# Patient Record
Sex: MALE | Race: WHITE | NOT HISPANIC OR LATINO
[De-identification: names, ages, dates, MRNs, and addresses within clinical notes are randomized per-mention and may not be internally consistent; named-entity substitution may affect disease eponyms.]

---

## 2019-10-10 PROBLEM — Z00.00 ENCOUNTER FOR PREVENTIVE HEALTH EXAMINATION: Status: ACTIVE | Noted: 2019-10-10

## 2019-10-18 ENCOUNTER — APPOINTMENT (OUTPATIENT)
Dept: FAMILY MEDICINE | Facility: CLINIC | Age: 24
End: 2019-10-18
Payer: MEDICAID

## 2019-10-18 VITALS
RESPIRATION RATE: 14 BRPM | SYSTOLIC BLOOD PRESSURE: 100 MMHG | DIASTOLIC BLOOD PRESSURE: 70 MMHG | WEIGHT: 145 LBS | OXYGEN SATURATION: 98 % | HEART RATE: 76 BPM | HEIGHT: 69 IN | BODY MASS INDEX: 21.48 KG/M2

## 2019-10-18 DIAGNOSIS — Z80.3 FAMILY HISTORY OF MALIGNANT NEOPLASM OF BREAST: ICD-10-CM

## 2019-10-18 DIAGNOSIS — G47.30 SLEEP APNEA, UNSPECIFIED: ICD-10-CM

## 2019-10-18 DIAGNOSIS — Z82.0 FAMILY HISTORY OF EPILEPSY AND OTHER DISEASES OF THE NERVOUS SYSTEM: ICD-10-CM

## 2019-10-18 DIAGNOSIS — Z78.9 OTHER SPECIFIED HEALTH STATUS: ICD-10-CM

## 2019-10-18 DIAGNOSIS — F42.9 OBSESSIVE-COMPULSIVE DISORDER, UNSPECIFIED: ICD-10-CM

## 2019-10-18 DIAGNOSIS — F90.9 ATTENTION-DEFICIT HYPERACTIVITY DISORDER, UNSPECIFIED TYPE: ICD-10-CM

## 2019-10-18 DIAGNOSIS — F60.5 OBSESSIVE-COMPULSIVE PERSONALITY DISORDER: ICD-10-CM

## 2019-10-18 DIAGNOSIS — Z81.1 FAMILY HISTORY OF ALCOHOL ABUSE AND DEPENDENCE: ICD-10-CM

## 2019-10-18 DIAGNOSIS — J30.9 ALLERGIC RHINITIS, UNSPECIFIED: ICD-10-CM

## 2019-10-18 PROCEDURE — 90674 CCIIV4 VAC NO PRSV 0.5 ML IM: CPT

## 2019-10-18 PROCEDURE — 99204 OFFICE O/P NEW MOD 45 MIN: CPT | Mod: 25

## 2019-10-18 PROCEDURE — G0008: CPT

## 2019-10-18 PROCEDURE — G0444 DEPRESSION SCREEN ANNUAL: CPT

## 2019-10-18 RX ORDER — LEVOMEFOLATE CALCIUM 15 MG
15 TABLET ORAL DAILY
Refills: 0 | Status: ACTIVE | COMMUNITY

## 2019-10-18 NOTE — REVIEW OF SYSTEMS
[Negative] : Heme/Lymph [Earache] : earache [Cough] : cough [Depression] : depression [Anxiety] : anxiety

## 2019-10-18 NOTE — ASSESSMENT
[FreeTextEntry1] : RTO for full physical and fasting labs \par Vitals  stable \par Flu vaccine admin to L deltoid, tolerated well \par Start abx, take with food, start flonase, if ear pain persists pt instructed to follow up with ENT. Cough likely related to allergic congestion, may start antihistamine in addition to flonase. \par Pt instructed to follow up with psych asap. Referrals given.

## 2019-10-18 NOTE — PHYSICAL EXAM
[Normal] : normal rate, regular rhythm, normal S1 and S2 and no murmur heard [Normal Sclera/Conjunctiva] : normal sclera/conjunctiva [Normal Outer Ear/Nose] : the outer ears and nose were normal in appearance [Supple] : supple [No Lymphadenopathy] : no lymphadenopathy [de-identified] : bilateral nasal turbinates and posterior oropharynx erythematous, moderate clear post nasal drip, R TM moderate serous effusion, purulence noted behind TM. L TM normal

## 2019-10-18 NOTE — HEALTH RISK ASSESSMENT
[Good] : ~his/her~ current health as good [Yes] : Yes [HIV test declined] : HIV test declined [Hepatitis C test declined] : Hepatitis C test declined [With Family] : lives with family [None] : None [Employed] : employed [Sexually Active] : sexually active [Single] : single [Feels Safe at Home] : Feels safe at home [Fully functional (bathing, dressing, toileting, transferring, walking, feeding)] : Fully functional (bathing, dressing, toileting, transferring, walking, feeding) [Fully functional (using the telephone, shopping, preparing meals, housekeeping, doing laundry, using] : Fully functional and needs no help or supervision to perform IADLs (using the telephone, shopping, preparing meals, housekeeping, doing laundry, using transportation, managing medications and managing finances) [Smoke Detector] : smoke detector [Safety elements used in home] : safety elements used in home [Carbon Monoxide Detector] : carbon monoxide detector [Seat Belt] :  uses seat belt [Sunscreen] : uses sunscreen [1 or 2 (0 pts)] : 1 or 2 (0 points) [2 - 4 times a month (2 pts)] : 2-4 times a month (2 points) [Never (0 pts)] : Never (0 points) [No] : In the past 12 months have you used drugs other than those required for medical reasons? No [No falls in past year] : Patient reported no falls in the past year [High School] : high school [3] : 2) Feeling down, depressed, or hopeless for nearly every day (3) [] : No [de-identified] : none [de-identified] : rarely  [Audit-CScore] : 2 [de-identified] : sedentary  [de-identified] : healthy, well balanced   [[id number]] : 6 [[id number]] : 24 [Change in mental status noted] : No change in mental status noted [Language] : denies difficulty with language [Behavior] : denies difficulty with behavior [Learning/Retaining New Information] : denies difficulty learning/retaining new information [Handling Complex Tasks] : denies difficulty handling complex tasks [Reasoning] : denies difficulty with reasoning [Spatial Ability and Orientation] : denies difficulty with spatial ability and orientation [Reports changes in hearing] : Reports no changes in hearing [Reports changes in vision] : Reports no changes in vision [Reports changes in dental health] : Reports no changes in dental health [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

## 2019-10-18 NOTE — HISTORY OF PRESENT ILLNESS
[FreeTextEntry1] : patient presents to establish care  [de-identified] : Patient presents today to establish care. Reports feeling well today. Patient has a chief complaint of ear pain x several months. Reports associated decreased hearing, vertigo. Pt has ongoing congestion, he attributes to food sensitivities, he has seen allergist with no resolution or definitive allergies. Pt also reports intermittent dry cough, approx 1-2 per day, not bringing up sputum. Denies wheezing, shortness of breath, sinus pressure, headache, night sweats, weight loss. He is currently looking for new psychiatrist to manage his mental health issues, states he has been suffering from mental health issues since childhood, symptoms have been steady. No current suicidal thoughts/plans.

## 2019-11-21 ENCOUNTER — APPOINTMENT (OUTPATIENT)
Dept: FAMILY MEDICINE | Facility: CLINIC | Age: 24
End: 2019-11-21
Payer: MEDICAID

## 2019-11-21 VITALS
BODY MASS INDEX: 21.62 KG/M2 | OXYGEN SATURATION: 97 % | HEIGHT: 69 IN | DIASTOLIC BLOOD PRESSURE: 70 MMHG | RESPIRATION RATE: 14 BRPM | SYSTOLIC BLOOD PRESSURE: 92 MMHG | WEIGHT: 146 LBS | HEART RATE: 82 BPM

## 2019-11-21 DIAGNOSIS — Z00.00 ENCOUNTER FOR GENERAL ADULT MEDICAL EXAMINATION W/OUT ABNORMAL FINDINGS: ICD-10-CM

## 2019-11-21 DIAGNOSIS — F41.9 ANXIETY DISORDER, UNSPECIFIED: ICD-10-CM

## 2019-11-21 DIAGNOSIS — F32.9 ANXIETY DISORDER, UNSPECIFIED: ICD-10-CM

## 2019-11-21 LAB
BILIRUB UR QL STRIP: NEGATIVE
GLUCOSE UR-MCNC: NEGATIVE
HCG UR QL: 0.2 EU/DL
HGB UR QL STRIP.AUTO: NEGATIVE
KETONES UR-MCNC: 15
LEUKOCYTE ESTERASE UR QL STRIP: NEGATIVE
NITRITE UR QL STRIP: NEGATIVE
PH UR STRIP: 6.5
PROT UR STRIP-MCNC: NEGATIVE
SP GR UR STRIP: 1.02

## 2019-11-21 PROCEDURE — 36415 COLL VENOUS BLD VENIPUNCTURE: CPT

## 2019-11-21 PROCEDURE — 99395 PREV VISIT EST AGE 18-39: CPT | Mod: 25

## 2019-11-21 PROCEDURE — G0444 DEPRESSION SCREEN ANNUAL: CPT

## 2019-11-21 PROCEDURE — 81003 URINALYSIS AUTO W/O SCOPE: CPT | Mod: QW

## 2019-11-21 NOTE — HISTORY OF PRESENT ILLNESS
[FreeTextEntry1] : cpe [de-identified] : Patient reports today for a physical. Patient reports feeling well today, no acute complaints. Denies chest pain, shortness of breath, palpitations, headaches, edema, dizziness, bowel or bladder changes. Pt states his anxiety, depression has improved since the last visit -  he is still considering finding a therapist/ psychiatrist. He does not want to go on any medication. He denies any suicidal thoughts or plans.

## 2019-11-21 NOTE — ASSESSMENT
[FreeTextEntry1] : Check labs drawn in office today for above assessed conditions. Labs to be resulted at the [facility] core lab.\par PHQ improved from 24 to 17. Pt encouraged to seek psychiatry and therapist. Pt states he will consider it. \par Vitals and exam stable \par UA normal

## 2019-11-21 NOTE — PHYSICAL EXAM
[No Acute Distress] : no acute distress [Well Nourished] : well nourished [Well Developed] : well developed [Well-Appearing] : well-appearing [Normal Sclera/Conjunctiva] : normal sclera/conjunctiva [PERRL] : pupils equal round and reactive to light [EOMI] : extraocular movements intact [Normal Outer Ear/Nose] : the outer ears and nose were normal in appearance [Normal Oropharynx] : the oropharynx was normal [Normal Nasal Mucosa] : the nasal mucosa was normal [No JVD] : no jugular venous distention [No Lymphadenopathy] : no lymphadenopathy [Supple] : supple [Thyroid Normal, No Nodules] : the thyroid was normal and there were no nodules present [No Respiratory Distress] : no respiratory distress  [No Accessory Muscle Use] : no accessory muscle use [Clear to Auscultation] : lungs were clear to auscultation bilaterally [Normal Rate] : normal rate  [Regular Rhythm] : with a regular rhythm [Normal S1, S2] : normal S1 and S2 [No Murmur] : no murmur heard [No Carotid Bruits] : no carotid bruits [No Abdominal Bruit] : a ~M bruit was not heard ~T in the abdomen [No Varicosities] : no varicosities [Pedal Pulses Present] : the pedal pulses are present [No Edema] : there was no peripheral edema [No Palpable Aorta] : no palpable aorta [No Extremity Clubbing/Cyanosis] : no extremity clubbing/cyanosis [Soft] : abdomen soft [Non Tender] : non-tender [Non-distended] : non-distended [No Masses] : no abdominal mass palpated [No HSM] : no HSM [Normal Bowel Sounds] : normal bowel sounds [Normal Posterior Cervical Nodes] : no posterior cervical lymphadenopathy [Normal Anterior Cervical Nodes] : no anterior cervical lymphadenopathy [No CVA Tenderness] : no CVA  tenderness [No Spinal Tenderness] : no spinal tenderness [No Joint Swelling] : no joint swelling [Grossly Normal Strength/Tone] : grossly normal strength/tone [No Rash] : no rash [Coordination Grossly Intact] : coordination grossly intact [No Focal Deficits] : no focal deficits [Normal Gait] : normal gait [Speech Grossly Normal] : speech grossly normal [Memory Grossly Normal] : memory grossly normal [Normal Affect] : the affect was normal [Alert and Oriented x3] : oriented to person, place, and time [Normal Mood] : the mood was normal [Normal Insight/Judgement] : insight and judgment were intact [de-identified] : bilateral Tms mild erythema, no purulence noted behind TM, mild serous effusion

## 2019-11-25 LAB
ALBUMIN SERPL ELPH-MCNC: 4.5 G/DL
ALP BLD-CCNC: 72 U/L
ALT SERPL-CCNC: 12 U/L
ANION GAP SERPL CALC-SCNC: 12 MMOL/L
AST SERPL-CCNC: 15 U/L
BASOPHILS # BLD AUTO: 0.04 K/UL
BASOPHILS NFR BLD AUTO: 1 %
BILIRUB SERPL-MCNC: 0.6 MG/DL
BUN SERPL-MCNC: 15 MG/DL
CALCIUM SERPL-MCNC: 9.9 MG/DL
CHLORIDE SERPL-SCNC: 101 MMOL/L
CHOLEST SERPL-MCNC: 214 MG/DL
CHOLEST/HDLC SERPL: 4 RATIO
CO2 SERPL-SCNC: 27 MMOL/L
CREAT SERPL-MCNC: 0.85 MG/DL
EOSINOPHIL # BLD AUTO: 0.05 K/UL
EOSINOPHIL NFR BLD AUTO: 1.3 %
GLUCOSE SERPL-MCNC: 89 MG/DL
HCT VFR BLD CALC: 49 %
HDLC SERPL-MCNC: 53 MG/DL
HGB BLD-MCNC: 15.4 G/DL
IMM GRANULOCYTES NFR BLD AUTO: 0.3 %
LDLC SERPL CALC-MCNC: 150 MG/DL
LYMPHOCYTES # BLD AUTO: 1.39 K/UL
LYMPHOCYTES NFR BLD AUTO: 35.9 %
MAN DIFF?: NORMAL
MCHC RBC-ENTMCNC: 26.8 PG
MCHC RBC-ENTMCNC: 31.4 GM/DL
MCV RBC AUTO: 85.2 FL
MONOCYTES # BLD AUTO: 0.55 K/UL
MONOCYTES NFR BLD AUTO: 14.2 %
NEUTROPHILS # BLD AUTO: 1.83 K/UL
NEUTROPHILS NFR BLD AUTO: 47.3 %
PLATELET # BLD AUTO: 191 K/UL
POTASSIUM SERPL-SCNC: 5 MMOL/L
PROT SERPL-MCNC: 7.1 G/DL
RBC # BLD: 5.75 M/UL
RBC # FLD: 12.6 %
SODIUM SERPL-SCNC: 140 MMOL/L
T4 SERPL-MCNC: 6.1 UG/DL
TRIGL SERPL-MCNC: 53 MG/DL
TSH SERPL-ACNC: 0.97 UIU/ML
WBC # FLD AUTO: 3.87 K/UL

## 2019-12-17 DIAGNOSIS — E78.5 HYPERLIPIDEMIA, UNSPECIFIED: ICD-10-CM

## 2020-01-30 ENCOUNTER — APPOINTMENT (OUTPATIENT)
Dept: FAMILY MEDICINE | Facility: CLINIC | Age: 25
End: 2020-01-30
Payer: MEDICAID

## 2020-01-30 VITALS
HEART RATE: 74 BPM | SYSTOLIC BLOOD PRESSURE: 94 MMHG | HEIGHT: 69 IN | RESPIRATION RATE: 14 BRPM | BODY MASS INDEX: 21.62 KG/M2 | DIASTOLIC BLOOD PRESSURE: 62 MMHG | OXYGEN SATURATION: 98 % | WEIGHT: 146 LBS

## 2020-01-30 DIAGNOSIS — Z92.29 PERSONAL HISTORY OF OTHER DRUG THERAPY: ICD-10-CM

## 2020-01-30 DIAGNOSIS — R05 COUGH: ICD-10-CM

## 2020-01-30 DIAGNOSIS — Z13.220 ENCOUNTER FOR SCREENING FOR LIPOID DISORDERS: ICD-10-CM

## 2020-01-30 DIAGNOSIS — Z13.0 ENCOUNTER FOR SCREENING FOR DISEASES OF THE BLOOD AND BLOOD-FORMING ORGANS AND CERTAIN DISORDERS INVOLVING THE IMMUNE MECHANISM: ICD-10-CM

## 2020-01-30 DIAGNOSIS — Z13.29 ENCOUNTER FOR SCREENING FOR OTHER SUSPECTED ENDOCRINE DISORDER: ICD-10-CM

## 2020-01-30 DIAGNOSIS — Z76.89 PERSONS ENCOUNTERING HEALTH SERVICES IN OTHER SPECIFIED CIRCUMSTANCES: ICD-10-CM

## 2020-01-30 DIAGNOSIS — H66.001 ACUTE SUPPURATIVE OTITIS MEDIA W/OUT SPONTANEOUS RUPTURE OF EAR DRUM, RIGHT EAR: ICD-10-CM

## 2020-01-30 DIAGNOSIS — R09.82 POSTNASAL DRIP: ICD-10-CM

## 2020-01-30 PROCEDURE — 99214 OFFICE O/P EST MOD 30 MIN: CPT

## 2020-01-30 RX ORDER — LEVOCETIRIZINE DIHYDROCHLORIDE 5 MG/1
5 TABLET ORAL DAILY
Qty: 30 | Refills: 0 | Status: ACTIVE | COMMUNITY
Start: 2020-01-30 | End: 1900-01-01

## 2020-01-30 RX ORDER — AMOXICILLIN AND CLAVULANATE POTASSIUM 875; 125 MG/1; MG/1
875-125 TABLET, COATED ORAL
Qty: 20 | Refills: 0 | Status: ACTIVE | COMMUNITY
Start: 2019-10-18 | End: 1900-01-01

## 2020-01-30 RX ORDER — FLUTICASONE PROPIONATE 50 UG/1
50 SPRAY, METERED NASAL
Qty: 1 | Refills: 0 | Status: ACTIVE | COMMUNITY
Start: 2019-10-18 | End: 1900-01-01

## 2020-01-30 NOTE — ASSESSMENT
[FreeTextEntry1] : Start flonase, xyzal, and abx - take with food\par If sx persist pt to follow up with ENT

## 2020-01-30 NOTE — PHYSICAL EXAM
[Normal Sclera/Conjunctiva] : normal sclera/conjunctiva [Normal Outer Ear/Nose] : the outer ears and nose were normal in appearance [No Lymphadenopathy] : no lymphadenopathy [Normal] : normal rate, regular rhythm, normal S1 and S2 and no murmur heard [de-identified] : bilateral nasal turbinates and posterior oropharynx erythematous, moderate clear post nasal drip, bilateral TMs mild erythema, purulence noted bilaterally to inferior portion of TMs, mild serous effusion.

## 2020-01-30 NOTE — HISTORY OF PRESENT ILLNESS
[FreeTextEntry8] : pt c/o intermittent bilateral ear pain, dry cough since last visit. Reports some mild vertigo a month ago, resolved. Patient states he never took antibiotics due to a pharmacy issue with his insurance. Denies fevers, chills, sore throat.

## 2020-07-17 ENCOUNTER — TRANSCRIPTION ENCOUNTER (OUTPATIENT)
Age: 25
End: 2020-07-17

## 2021-01-17 ENCOUNTER — TRANSCRIPTION ENCOUNTER (OUTPATIENT)
Age: 26
End: 2021-01-17

## 2023-08-14 ENCOUNTER — NON-APPOINTMENT (OUTPATIENT)
Age: 28
End: 2023-08-14

## 2024-12-02 ENCOUNTER — INPATIENT (INPATIENT)
Facility: HOSPITAL | Age: 29
LOS: 37 days | Discharge: HOME | End: 2025-01-09
Attending: STUDENT IN AN ORGANIZED HEALTH CARE EDUCATION/TRAINING PROGRAM | Admitting: PSYCHIATRY & NEUROLOGY
Payer: COMMERCIAL

## 2024-12-02 VITALS
DIASTOLIC BLOOD PRESSURE: 81 MMHG | WEIGHT: 138.89 LBS | SYSTOLIC BLOOD PRESSURE: 125 MMHG | HEIGHT: 69 IN | HEART RATE: 86 BPM | RESPIRATION RATE: 16 BRPM | TEMPERATURE: 98 F | OXYGEN SATURATION: 99 %

## 2024-12-02 DIAGNOSIS — F42.9 OBSESSIVE-COMPULSIVE DISORDER, UNSPECIFIED: ICD-10-CM

## 2024-12-02 DIAGNOSIS — F32.9 MAJOR DEPRESSIVE DISORDER, SINGLE EPISODE, UNSPECIFIED: ICD-10-CM

## 2024-12-02 LAB
ALBUMIN SERPL ELPH-MCNC: 4.8 G/DL — SIGNIFICANT CHANGE UP (ref 3.3–5)
ALP SERPL-CCNC: 85 U/L — SIGNIFICANT CHANGE UP (ref 40–120)
ALT FLD-CCNC: 21 U/L — SIGNIFICANT CHANGE UP (ref 4–41)
AMPHET UR-MCNC: NEGATIVE — SIGNIFICANT CHANGE UP
ANION GAP SERPL CALC-SCNC: 20 MMOL/L — HIGH (ref 7–14)
APAP SERPL-MCNC: <10 UG/ML — LOW (ref 15–25)
APPEARANCE UR: CLEAR — SIGNIFICANT CHANGE UP
AST SERPL-CCNC: 19 U/L — SIGNIFICANT CHANGE UP (ref 4–40)
BARBITURATES UR SCN-MCNC: NEGATIVE — SIGNIFICANT CHANGE UP
BASOPHILS # BLD AUTO: 0.05 K/UL — SIGNIFICANT CHANGE UP (ref 0–0.2)
BASOPHILS NFR BLD AUTO: 0.9 % — SIGNIFICANT CHANGE UP (ref 0–2)
BENZODIAZ UR-MCNC: NEGATIVE — SIGNIFICANT CHANGE UP
BILIRUB SERPL-MCNC: 0.6 MG/DL — SIGNIFICANT CHANGE UP (ref 0.2–1.2)
BILIRUB UR-MCNC: NEGATIVE — SIGNIFICANT CHANGE UP
BUN SERPL-MCNC: 7 MG/DL — SIGNIFICANT CHANGE UP (ref 7–23)
CALCIUM SERPL-MCNC: 10.2 MG/DL — SIGNIFICANT CHANGE UP (ref 8.4–10.5)
CHLORIDE SERPL-SCNC: 93 MMOL/L — LOW (ref 98–107)
CO2 SERPL-SCNC: 25 MMOL/L — SIGNIFICANT CHANGE UP (ref 22–31)
COCAINE METAB.OTHER UR-MCNC: NEGATIVE — SIGNIFICANT CHANGE UP
COLOR SPEC: YELLOW — SIGNIFICANT CHANGE UP
CREAT SERPL-MCNC: 0.93 MG/DL — SIGNIFICANT CHANGE UP (ref 0.5–1.3)
CREATININE URINE RESULT, DAU: 82 MG/DL — SIGNIFICANT CHANGE UP
DIFF PNL FLD: NEGATIVE — SIGNIFICANT CHANGE UP
EGFR: 113 ML/MIN/1.73M2 — SIGNIFICANT CHANGE UP
EOSINOPHIL # BLD AUTO: 0.05 K/UL — SIGNIFICANT CHANGE UP (ref 0–0.5)
EOSINOPHIL NFR BLD AUTO: 0.9 % — SIGNIFICANT CHANGE UP (ref 0–6)
ETHANOL SERPL-MCNC: <10 MG/DL — SIGNIFICANT CHANGE UP
FENTANYL UR QL SCN: NEGATIVE — SIGNIFICANT CHANGE UP
GLUCOSE SERPL-MCNC: 84 MG/DL — SIGNIFICANT CHANGE UP (ref 70–99)
GLUCOSE UR QL: NEGATIVE MG/DL — SIGNIFICANT CHANGE UP
HCT VFR BLD CALC: 45 % — SIGNIFICANT CHANGE UP (ref 39–50)
HGB BLD-MCNC: 15 G/DL — SIGNIFICANT CHANGE UP (ref 13–17)
IANC: 3.32 K/UL — SIGNIFICANT CHANGE UP (ref 1.8–7.4)
IMM GRANULOCYTES NFR BLD AUTO: 0.2 % — SIGNIFICANT CHANGE UP (ref 0–0.9)
KETONES UR-MCNC: 80 MG/DL
LEUKOCYTE ESTERASE UR-ACNC: NEGATIVE — SIGNIFICANT CHANGE UP
LYMPHOCYTES # BLD AUTO: 1.74 K/UL — SIGNIFICANT CHANGE UP (ref 1–3.3)
LYMPHOCYTES # BLD AUTO: 29.9 % — SIGNIFICANT CHANGE UP (ref 13–44)
MCHC RBC-ENTMCNC: 26.3 PG — LOW (ref 27–34)
MCHC RBC-ENTMCNC: 33.3 G/DL — SIGNIFICANT CHANGE UP (ref 32–36)
MCV RBC AUTO: 78.9 FL — LOW (ref 80–100)
METHADONE UR-MCNC: NEGATIVE — SIGNIFICANT CHANGE UP
MONOCYTES # BLD AUTO: 0.65 K/UL — SIGNIFICANT CHANGE UP (ref 0–0.9)
MONOCYTES NFR BLD AUTO: 11.2 % — SIGNIFICANT CHANGE UP (ref 2–14)
NEUTROPHILS # BLD AUTO: 3.32 K/UL — SIGNIFICANT CHANGE UP (ref 1.8–7.4)
NEUTROPHILS NFR BLD AUTO: 56.9 % — SIGNIFICANT CHANGE UP (ref 43–77)
NITRITE UR-MCNC: NEGATIVE — SIGNIFICANT CHANGE UP
NRBC # BLD: 0 /100 WBCS — SIGNIFICANT CHANGE UP (ref 0–0)
NRBC # FLD: 0 K/UL — SIGNIFICANT CHANGE UP (ref 0–0)
OPIATES UR-MCNC: NEGATIVE — SIGNIFICANT CHANGE UP
OXYCODONE UR-MCNC: NEGATIVE — SIGNIFICANT CHANGE UP
PCP SPEC-MCNC: SIGNIFICANT CHANGE UP
PCP UR-MCNC: NEGATIVE — SIGNIFICANT CHANGE UP
PH UR: 6.5 — SIGNIFICANT CHANGE UP (ref 5–8)
PLATELET # BLD AUTO: 185 K/UL — SIGNIFICANT CHANGE UP (ref 150–400)
POTASSIUM SERPL-MCNC: 3.9 MMOL/L — SIGNIFICANT CHANGE UP (ref 3.5–5.3)
POTASSIUM SERPL-SCNC: 3.9 MMOL/L — SIGNIFICANT CHANGE UP (ref 3.5–5.3)
PROT SERPL-MCNC: 7.8 G/DL — SIGNIFICANT CHANGE UP (ref 6–8.3)
PROT UR-MCNC: SIGNIFICANT CHANGE UP MG/DL
RBC # BLD: 5.7 M/UL — SIGNIFICANT CHANGE UP (ref 4.2–5.8)
RBC # FLD: 13 % — SIGNIFICANT CHANGE UP (ref 10.3–14.5)
SALICYLATES SERPL-MCNC: <0.3 MG/DL — LOW (ref 15–30)
SARS-COV-2 RNA SPEC QL NAA+PROBE: SIGNIFICANT CHANGE UP
SODIUM SERPL-SCNC: 138 MMOL/L — SIGNIFICANT CHANGE UP (ref 135–145)
SP GR SPEC: 1.01 — SIGNIFICANT CHANGE UP (ref 1–1.03)
THC UR QL: NEGATIVE — SIGNIFICANT CHANGE UP
TOXICOLOGY SCREEN, DRUGS OF ABUSE, SERUM RESULT: SIGNIFICANT CHANGE UP
TSH SERPL-MCNC: 1.74 UIU/ML — SIGNIFICANT CHANGE UP (ref 0.27–4.2)
UROBILINOGEN FLD QL: 1 MG/DL — SIGNIFICANT CHANGE UP (ref 0.2–1)
WBC # BLD: 5.82 K/UL — SIGNIFICANT CHANGE UP (ref 3.8–10.5)
WBC # FLD AUTO: 5.82 K/UL — SIGNIFICANT CHANGE UP (ref 3.8–10.5)

## 2024-12-02 PROCEDURE — 99285 EMERGENCY DEPT VISIT HI MDM: CPT

## 2024-12-02 RX ORDER — LORAZEPAM 1 MG/1
1 TABLET ORAL EVERY 6 HOURS
Refills: 0 | Status: DISCONTINUED | OUTPATIENT
Start: 2024-12-03 | End: 2024-12-10

## 2024-12-02 RX ORDER — TRAZODONE HYDROCHLORIDE 150 MG/1
50 TABLET ORAL AT BEDTIME
Refills: 0 | Status: DISCONTINUED | OUTPATIENT
Start: 2024-12-03 | End: 2025-01-09

## 2024-12-02 RX ORDER — LORAZEPAM 1 MG/1
2 TABLET ORAL ONCE
Refills: 0 | Status: DISCONTINUED | OUTPATIENT
Start: 2024-12-03 | End: 2024-12-10

## 2024-12-02 NOTE — ED ADULT NURSE NOTE - CHIEF COMPLAINT QUOTE
Patient requesting psychiatric admission. Reports he was recently evicted from his apartment and is unable to care for himself. Patient's sister with in reports patient has extreme OCD behaviors and has not been taking his medications, seems to be paranoid about things being "contaminated." Patient calm and cooperative, denies SI/HI. Phx OCD, ADHD, depression. Sister [facility]: [phone]

## 2024-12-02 NOTE — ED PROVIDER NOTE - NSFOLLOWUPINSTRUCTIONS_ED_ALL_ED_FT
Follow up with your PMD within 48-72 hrs. Show copies of your reports given to you.   Worsening, continued or new concerning symptoms return to the emergency department.    You have been given information necessary to follow up with the  [facility] ([facility]) Crisis center & other outpatient  psychiatric clinics within your community    • [facility] walk in Crisis centre  [address] [place] [address]  [phone] [email or web address]  Hours of operation:  Day	                                        Hours  Sunday                                  Closed  Monday                                9am - 3pm  Tuesday                                9am - 3pm  Wednesday                          9am - 3pm  Thursday                               9am - 3pm  Friday                                    9am - 3pm  Saturday                                Closed

## 2024-12-02 NOTE — ED ADULT NURSE REASSESSMENT NOTE - NS ED NURSE REASSESS COMMENT FT1
Report received from day RN Shameka. Pt is A&Ox4 and ambulatory without assistance. Pt appears comfortable and in NAD. Pt has no complaints at this time. Pt cooperative with blood draw. Labs sent. Awaiting psych admission bed. Respirations even and unlabored. VS as noted.

## 2024-12-02 NOTE — ED PROVIDER NOTE - CLINICAL SUMMARY MEDICAL DECISION MAKING FREE TEXT BOX
30 yo M PMH OCD p/w increased paranoia. Patient admits he is unable to take care of himself. Patient believes everything is contaminated. Admits noncompliance with medications and therapy. Admits to not showering x6 months and not washing sheets for same time period. Admits the sheets are filthy with yellow stains, urine, feces and blood stains. Patient use to work for [employer] and [employer] but unable to keep a job at the moment. Mom and dad moved to [place] and patient was taken in by his neighbor but kicked out x1 week ago. Patient admits he only had 5 minutes to pack. Reports he is depressed and living in a room where the pipe burst, mold and mice are around. Denies fever, headache, dizziness, SI/HI/AH/VH, chills, chest pain, shortness of breath, abdominal pain, sick contact or recent travel. Denies alcohol use or other drugs.   Labs, EKG, COVID  SW collateral  Psych consult  Dispo as per consult
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
## 2024-12-02 NOTE — ED PROVIDER NOTE - CARE PLAN
1 Principal Discharge DX:	OCD (obsessive compulsive disorder)  Secondary Diagnosis:	MDD (major depressive disorder)

## 2024-12-02 NOTE — ED BEHAVIORAL HEALTH NOTE - BEHAVIORAL HEALTH NOTE
Writer contacted patient's sister gordon ([phone]) and left a vm  informing her of patient's admission.

## 2024-12-02 NOTE — ED BEHAVIORAL HEALTH ASSESSMENT NOTE - NSSUICPROTFACT_PSY_ALL_CORE
help seeking; no reported hx of SA/Responsibility to children, family, or others/Identifies reasons for living/Supportive social network of family or friends/Fear of death or the actual act of killing self/Positive therapeutic relationships

## 2024-12-02 NOTE — ED ADULT NURSE NOTE - OBJECTIVE STATEMENT
Patient requesting psychiatric admission. Reports he was recently evicted from his apartment and is unable to care for himself. Patient's sister with in reports patient has extreme OCD behaviors and has not been taking his medications, seems to be paranoid about things being "contaminated." Patient calm and cooperative, denies SI/HI. Phx OCD, ADHD, depression  Patient brought to  area for above complaints. Patient is calm and cooperative. Patient's sister also adds that the patient has not been showering and taking care of himself. Patient is calm and cooperative. Evaluated by medical provider. Waiting for further orders and disposition.   RAMAN Krause

## 2024-12-02 NOTE — ED BEHAVIORAL HEALTH ASSESSMENT NOTE - PSYCHIATRIC ISSUES AND PLAN (INCLUDE STANDING AND PRN MEDICATION)
09-Feb-2022 defer standing psychotropic to primary treatment team (Pt reported responding to sertraline in the past). PRNS: ativan 1mg PO/ ativan 2mg IM q6Hrs for agitation. PRN: trazodone 50mg HS for sleep disturbances
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
## 2024-12-02 NOTE — ED BEHAVIORAL HEALTH ASSESSMENT NOTE - DESCRIPTION
single and domiciled alone - on the verge of getting evicted; plans to relocate to [place] once he is discharged from [facility]. college drop out.   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 family based in [place] close with twin sister, Yash. when euthymic, likes writing/ drawing, used to play saxophone. raised Orthodoxy but currently not [employer]ybeterian.  no pets. no reported access to guns self reported hx of sleep apnea (but NOT ON CPAP); unclear bleeding diathesis (not in treatment), hx of rhinosinusitis s/p nasal surgeries upon his  ED presentation, no occurrence of agitation/aggressive behavior.  No verbalization of active/ passive SI/HI.   There are no signs/symptoms of acute homer or florid psychosis.  does not appear intoxicated. not delirious nor catatonic. no PRN meds. overall, no mgt issues reported    Vital Signs Last 24 Hrs  T(C): 36.6 (02 Dec 2024 17:32), Max: 36.6 (02 Dec 2024 17:32)  T(F): 97.8 (02 Dec 2024 17:32), Max: 97.8 (02 Dec 2024 17:32)  HR: 86 (02 Dec 2024 17:32) (86 - 86)  BP: 125/81 (02 Dec 2024 17:32) (125/81 - 125/81)  BP(mean): --  RR: 16 (02 Dec 2024 17:32) (16 - 16)  SpO2: 99% (02 Dec 2024 17:32) (99% - 99%)    Parameters below as of 02 Dec 2024 17:32  Patient On (Oxygen Delivery Method): room air

## 2024-12-02 NOTE — ED BEHAVIORAL HEALTH ASSESSMENT NOTE - OTHER
CVM,  I stop due to worsening OCD symptoms, reported overall functionality has been impaired sister self employed on the verge of eviction

## 2024-12-02 NOTE — ED BEHAVIORAL HEALTH ASSESSMENT NOTE - HPI (INCLUDE ILLNESS QUALITY, SEVERITY, DURATION, TIMING, CONTEXT, MODIFYING FACTORS, ASSOCIATED SIGNS AND SYMPTOMS)
as per Psyckes, with multiple psych diagnoses of Major Depressive Disorder, Unspecified/Other Anxiety Disorder,  Generalized Anxiety Disorder, Restless Legs Syndrome  and Unspecified/Other Depressive Disorder    denied experiencing any specific anxiety disorder symptoms. no signs/ symptoms suggestive of homer (denied grandiosity/ racing thoughts/ increased goal directed activities or engaged in risk taking behavior/ no pressured speech/ no elevated mood/ denied any increased in energy level causing sleep disruption).  no reported perceptual disturbances experienced. he is not paranoid.. denied any thought insertion/ withdrawal/ broadcasting    ** see separate [facility] notes for collateral information obtained from his sister **   (if needs update on Pt's status, can also contact uncle Ariel Alaniz at [phone]) 29 yr old male, single, domiciled alone and self employed. with self reported hx of depression, anxiety, OCD and ADHD; no reported hx of psych admissions; denied any hx of SA but did have past hx of engaging in NSSIB - none recently; he denied any recent illicit substance use.  as per Psyckes, with multiple psych diagnoses of Major Depressive Disorder, Unspecified/Other Anxiety Disorder,  Generalized Anxiety Disorder, Restless Legs Syndrome  and Unspecified/Other Depressive Disorder.  Pt is currently not in psych care.  today, presented to the ED accompanied by sister due to worsening symptoms of anxiety + depression (predominating symptoms more of anxiety rather than depression)    seen bedside.  anxious and dysphoric. claimed that as a child, already had experienced symptoms suggestive of OCD. apart from OCD, also endorses life time hx of depression.  described OCD symptoms experienced - especially recently, as worsening contamination symptoms; other symptoms include "supernatural OCD" - which he described as seeing signs/ symbols - pointing to him "going to hell" (not necessarily believing this since he is not currently Anabaptist). has past hx of engaging in childhood rituals; e.g repeatedly flicking switch for bathroom light - to "scare spiders away"; there was also past hx of compulsive  praying in his younger yrs (was raised a Zoroastrian but currently, he is not Anabaptist).      for the past yr, his predominating OCD symptom is of contamination. specifically, centered on impact of mold spores causing infection/ impact on his health. e.g. cleaned bathroom using vinegar copiously that it led to the bathroom getting destroyed - leading him not to take showers (for almost half a yr) out of fear that if he gets into the shower, he will be "contaminated with the spores".  then relegated to laying in bed for days - not getting up; urinating/ fecal movement on the bed.      regarding his depressive symptoms, described self as "an extremely unhappy person who was always hard on himself". denied attaining any euthymic episode recently. described symptoms as experiencing sad mood daily accompanied by anhedonia; associated symptoms include: poor concentration; sleep disturbances; low energy level. denied any changes to his sleeping pattern. no SI or HI. denied feeling hopeless/ helpless/ worthless    He denied experiencing any signs/ symptoms suggestive of homer (denied grandiosity/ racing thoughts/ increased goal directed activities or engaged in risk taking behavior/ no pressured speech/ no elevated mood/ denied any increased in energy level causing sleep disruption).  no reported perceptual disturbances experienced. he is not paranoid.. denied any thought insertion/ withdrawal/ broadcasting    ** see separate [facility] notes for collateral information obtained from his sister **   (if needs update on Pt's status, can also contact uncle Ariel Alaniz at [phone])

## 2024-12-02 NOTE — ED BEHAVIORAL HEALTH ASSESSMENT NOTE - OTHER PAST PSYCHIATRIC HISTORY (INCLUDE DETAILS REGARDING ONSET, COURSE OF ILLNESS, INPATIENT/OUTPATIENT TREATMENT)
self endorses hx of OCD, depression and ADHD  multiple psychiatrists + therapists in the past but has not seen a psychiatrist x 1.5 - 2 yrs now  denied any hx of in-Pt psych admissions  no reported hx of SA but did have past hx of engaging in NSSIB via scratching self using object or finger nails, hx of biting self  - denied any recent NSSIB

## 2024-12-02 NOTE — ED BEHAVIORAL HEALTH ASSESSMENT NOTE - PAST PSYCHOTROPIC MEDICATION
past trials includes: Olanzapine 5 MG , 1/day; Sertraline Hcl 50 MG , 1/day; Bupropion Hcl Er (Xl)) 300 MG,  1/day; Hydroxyzine Pamoate 25 MG, 2/day; Gabapentin 300 MG, 3/day;  Trazodone Hcl 50 MG , 2/day; Diazepam 5 MG, 2/day

## 2024-12-02 NOTE — ED BEHAVIORAL HEALTH ASSESSMENT NOTE - DIFFERENTIAL
OCD  MDD  ADHD OCD  MDD  ADHD    will need evaluation to determine if there is underlying undiagnosed axis II pathology complicating current depression + anxiety

## 2024-12-02 NOTE — ED BEHAVIORAL HEALTH NOTE - BEHAVIORAL HEALTH NOTE
[facility] Reference #: [id number] - no controlled substances prescribed     PSYCKES:   WITH Behavioral Health Diagnoses including Major Depressive Disorder, Unspecified/Other Anxiety Disorder,  Obsessive-Compulsive Disorder, Generalized Anxiety Disorder,   Restless Legs Syndrome  and Unspecified/Other Depressive Disorder    substance use listed: Cannabis related disorders and Opioid related disorders      past trials includes: Olanzapine 5 MG , 1/day; Sertraline Hcl 50 MG , 1/day; Bupropion Hcl Er (Xl)) 300 MG,  1/day; Hydroxyzine Pamoate 25 MG, 2/day; Gabapentin 300 MG, 3/day;  Trazodone Hcl 50 MG , 2/day; Diazepam 5 MG, 2/day    no psych admissions per PSYCKES    CVM: no attendances in [facility] or OPD    [facility] [address] [facility]/ [employer] site: no pending legal issues listed

## 2024-12-02 NOTE — ED BEHAVIORAL HEALTH ASSESSMENT NOTE - AXIS III
self reported hx of sleep apnea (but NOT ON CPAP); unclear bleeding diathesis (not in treatment), hx of rhinosinusitis s/p nasal surgeries

## 2024-12-02 NOTE — ED BEHAVIORAL HEALTH ASSESSMENT NOTE - SUMMARY
29/M with self reported hx of OCD, depression and ADHD; has no psych admissions; denied any hx of SA but did previously engage in NSSIB; whilst he denied using any illicit substance use including alcohol, as per Psyckes: Pt has hx of cannabis + opiate use + psychostimulant abuse.  Pt is currently not in psych care.  pertinent medical issues include: self reported hx of sleep apnea (NOT ON CPAP); unclear bleeding diathesis (not in treatment), hx of rhinosinusitis and s/p nasal surgeries. today, presented to the ED accompanied by sister due to worsening OCD symptoms     at this time, endorses life long hx of anxiety + depression of which, anxiety symptoms meeting severe OCD criteria whereas depressive symptoms meeting MDD criteria. currently, anxiety symptoms predominating over depression.  reported worsening symptoms in the context of noncompliance to meds.      he is severely affectively dysregulated; is help seeking but with poor judgement.  Pt is not manic; does not appear acutely psychotic.  he is not harboring any passive/ active suicidal or homicidal thoughts.  does not appear intoxicated. is not delirious nor catatonic.      ongoing symptoms causing severe functional impairment.  he is requesting psych admission.   ED service will help facilitate psych admission once Pt is medically cleared.

## 2024-12-02 NOTE — ED BEHAVIORAL HEALTH ASSESSMENT NOTE - RISK ASSESSMENT
RISK FACTORS:  Modifiable risk factors: depression, anxiety/ OCD  Unmodifiable risk factors: young male, reported hx of depression, OCD, ADHD, treatment noncompliance, past hx as per Psyckes of  Cannabis related disorders and Opioid related disorders, father - alcohol, mother - hx of anxiety, Pt has past hx of SIB  Protective factors: denies (and currently no objective evidence of) suicidality, no hx of SA, help seeking, endorses responsibility to his family, no access to lethal means like guns, denied any complex medical issues or chronic pains, is not acutely manic or floridly psychotic, no pending legal cases    Given above, the Pt is currently NOT AT ACUTE risk for self harm nor is he at chronically elevated risk of self-harm.   Pt is seeking psych admission

## 2024-12-02 NOTE — ED ADULT TRIAGE NOTE - CHIEF COMPLAINT QUOTE
Patient requesting psychiatric admission. Reports he was recently evicted from his apartment and is unable to care for himself. Patient's sister with in reports patient has extreme OCD behaviors and has not been taking his medications, seems to be paranoid about things being "contaminated." Patient calm and cooperative, denies SI/HI. Phx OCD, ADHD, depression. Sister [facility]: [phone]

## 2024-12-02 NOTE — ED BEHAVIORAL HEALTH ASSESSMENT NOTE - DETAILS
denied hx of SA per transfer protocol mother - hx of anxiety; past in-Pt psych admissions; father has hx of alcoholism.. no reported hx of SA multiple truncal rashes; no cellulitic nor abscess noted endorses hx of hesitancy but denied any symptoms suggestive of UTI sister informed of this admission. discussed with ONELIA Sears mother - hx of anxiety; past in-Pt psych admissions; father has hx of alcoholism (currently sober).. no reported hx of SA

## 2024-12-02 NOTE — ED BEHAVIORAL HEALTH ASSESSMENT NOTE - NSPRESENTSXS_PSY_ALL_CORE
worsening anxiety >>> depression/Depressed mood/Anhedonia/Global insomnia/Severe anxiety, agitation or panic

## 2024-12-02 NOTE — ED BEHAVIORAL HEALTH ASSESSMENT NOTE - NSBHROSSYSTEMS_PSY_ALL_CORE
currently, Pt denied any headaches, no dizziness, no blurring of vision; no sorethroat; no cough, no fever. no chest pains, no SOB, no palpitations, no abdominal pains, no nausea/ vomiting/ diarrhea; denied any muscle/ joint pains/Genitourinary.../Skin.../Psychiatric

## 2024-12-03 PROCEDURE — 99222 1ST HOSP IP/OBS MODERATE 55: CPT

## 2024-12-03 RX ORDER — GINKGO BILOBA 40 MG
3 CAPSULE ORAL AT BEDTIME
Refills: 0 | Status: DISCONTINUED | OUTPATIENT
Start: 2024-12-03 | End: 2025-01-09

## 2024-12-03 RX ORDER — ACETAMINOPHEN 80 MG/.8ML
650 SOLUTION/ DROPS ORAL EVERY 6 HOURS
Refills: 0 | Status: DISCONTINUED | OUTPATIENT
Start: 2024-12-03 | End: 2025-01-09

## 2024-12-03 NOTE — BH INPATIENT PSYCHIATRY ASSESSMENT NOTE - MSE UNSTRUCTURED FT
Conscious, cooperative, alert.   No psychomotor agitation/retardation.   Good eye contact.   Speech: regular rate and rhythm,   Mood: "I have bad OCD" Affect: appropriate, full range, euthymic.   Thought Process: linear, goal directed   Thought Content: Contamination Obsession.  No Death wish, No Suicidal ideation/intent/plan, No homicidal ideation/intent/plan. No delusions   Perception: No hallucinations   Insight and Judgement: fair  Impulse Control: fair at this time.

## 2024-12-03 NOTE — PSYCHIATRIC REHAB INITIAL EVALUATION - NSBHLOCATIONHOME_PSY_ALL_CORE_FT
Pt reports that he is getting evicted from his landlord due to not taking care of the house. Pt will move to [place] to stay with family after discharge.

## 2024-12-03 NOTE — PSYCHIATRIC REHAB INITIAL EVALUATION - NSBHALCSUBTREAT_PSY_ALL_CORE
Pt has psychiatric history of depression, anxiety, OCD and ADHD without inpatient psychiatric admissions./None

## 2024-12-03 NOTE — BH INPATIENT PSYCHIATRY ASSESSMENT NOTE - NSBHCHARTREVIEWVS_PSY_A_CORE FT
Vital Signs Last 24 Hrs  T(C): 36.6 (12-03-24 @ 00:54), Max: 36.6 (12-03-24 @ 00:54)  T(F): 97.8 (12-03-24 @ 00:54), Max: 97.8 (12-03-24 @ 00:54)  HR: --  BP: --  BP(mean): --  RR: --  SpO2: --    Orthostatic VS  12-03-24 @ 00:54  Lying BP: --/-- HR: --  Sitting BP: 111/69 HR: 107  Standing BP: 106/67 HR: 102  Site: --  Mode: --

## 2024-12-03 NOTE — BH INPATIENT PSYCHIATRY ASSESSMENT NOTE - DETAILS
denied hx of SA mother - hx of anxiety; past in-Pt psych admissions; father has hx of alcoholism (currently sober).. no reported hx of SA

## 2024-12-03 NOTE — BH SOCIAL WORK INITIAL PSYCHOSOCIAL EVALUATION - OTHER PAST PSYCHIATRIC HISTORY (INCLUDE DETAILS REGARDING ONSET, COURSE OF ILLNESS, INPATIENT/OUTPATIENT TREATMENT)
Pt. is a 28 y/o male, single, domiciled alone with history of depression, anxiety, OCD and ADHD with no known psych. hospitalizations or suicide attempts, history of NSSIB although none recently. Denied any recent illicit substance use. Pt is currently not in outpatient psych. treatment. Pt. has had multiple psychiatrists and therapists in the past but has not seen a psychiatrist for about 2 years. Pt. presented to the ED accompanied by sister due to worsening symptoms of anxiety and depression (predominating symptoms more of anxiety rather than depression)  Pt. reported CD symptoms, especially recently, as worsening contamination symptoms; other symptoms include "supernatural OCD" - which he described as seeing signs/ symbols - pointing to him "going to hell" (not necessarily believing this since he is not currently Anabaptist). has past hx of engaging in childhood rituals; e.g repeatedly flicking switch for bathroom light - to "scare spiders away"; there was also past hx of compulsive  praying (was raised a Zoroastrian but currently, he is not Anabaptist).    In the past year, his predominating OCD symptom is of contamination. specifically, centered on impact of mold spores causing infection/ impact on his health. Pt. cleaned bathroom using vinegar copiously that it led to the bathroom getting destroyed - leading him not to take showers (for almost half a yr) out of fear that if he gets into the shower, he will be "contaminated with the spores".  then relegated to laying in bed for days - not getting up; urinating/ fecal movement on the bed.  Pt. described himself as  "an extremely unhappy person who was always hard on himself". Pt.'s sxs include  daily sad mood daily accompanied by anhedonia; poor concentration; sleep disturbances; low energy level.    Pt. is a 28 y/o male, single, domiciled alone with history of depression, anxiety, OCD and ADHD with no known psych. hospitalizations or suicide attempts, history of NSSIB although none recently. Denied any recent illicit substance use. Pt is currently not in outpatient psych. treatment. Pt. has had multiple psychiatrists and therapists in the past but has not seen a psychiatrist for about 2 years. Pt. presented to the ED accompanied by sister due to worsening symptoms of anxiety and depression (predominating symptoms more of anxiety rather than depression). According to reports pt. is on the verge of getting evicted, wants to move to [place] (family resides). Pt. was working as an [employer]  up until recently.  Pt. reported CD symptoms, especially recently, as worsening contamination symptoms; other symptoms include "supernatural OCD" - which he described as seeing signs/ symbols - pointing to him "going to hell" (not necessarily believing this since he is not currently Sabianist). has past hx of engaging in childhood rituals; e.g repeatedly flicking switch for bathroom light - to "scare spiders away"; there was also past hx of compulsive  praying (was raised a Sabianist but currently, he is not Sabianist).    In the past year, his predominating OCD symptom is of contamination. specifically, centered on impact of mold spores causing infection/ impact on his health. Pt. cleaned bathroom using vinegar copiously that it led to the bathroom getting destroyed - leading him not to take showers (for almost half a yr) out of fear that if he gets into the shower, he will be "contaminated with the spores".  then relegated to laying in bed for days - not getting up; urinating/ fecal movement on the bed.  Pt. described himself as  "an extremely unhappy person who was always hard on himself". Pt.'s sxs include  daily sad mood daily accompanied by anhedonia; poor concentration; sleep disturbances; low energy level.

## 2024-12-03 NOTE — BH SOCIAL WORK INITIAL PSYCHOSOCIAL EVALUATION - NSBHABUSEAPS_PSY_ALL_CORE
SUBJECTIVE:  Steven Garcia is a 5 year old male who is accompanied by with Mother and Father    Chief Complaint   Patient presents with   • Follow-up     ear       Ear Problem    The current episode started more than 2 weeks ago. The onset was gradual. The ear pain is mild. There is pain in the left ear. There is no abnormality behind the ear. The symptoms are relieved by one or more prescription drugs. Pertinent negatives include no fever, no abdominal pain, no constipation, no diarrhea, no ear pain, no rhinorrhea, no sore throat, no cough, no wheezing, no rash, no eye discharge and no eye pain. He has been behaving normally. He has been eating and drinking normally.   Has tried two courses of antibiotics, has been eating better per parents    Review of Systems   Constitutional: Negative for activity change, appetite change, fatigue and fever.   HENT: Negative for ear pain, rhinorrhea and sore throat.    Eyes: Negative for pain, discharge and visual disturbance.   Respiratory: Negative for cough, shortness of breath and wheezing.    Cardiovascular: Negative for chest pain and palpitations.   Gastrointestinal: Negative for abdominal pain, constipation and diarrhea.   Endocrine: Negative.    Genitourinary: Negative for urgency.   Skin: Negative for rash.     No current outpatient medications on file.     No current facility-administered medications for this visit.        Patient's medications, allergies, past medical, surgical, social and family histories were reviewed and updated as appropriate.    OBJECTIVE:  Visit Vitals  BP 97/56 (Patient Position: Sitting, Cuff Size: Pediatric)   Pulse 67   Temp 98.2 °F (36.8 °C) (Temporal)   Ht 3' 7.62\" (1.108 m)   Wt (!) 17.2 kg (37 lb 14.7 oz)   BMI 14.01 kg/m²     Physical Exam   Constitutional: He appears well-developed. He is active. No distress.   HENT:   Right Ear: Tympanic membrane is abnormal.   Left Ear: Tympanic membrane normal.   Mouth/Throat: Mucous membranes are  moist. No tonsillar exudate. Oropharynx is clear. Pharynx is normal.   Eyes: Pupils are equal, round, and reactive to light. EOM are normal.   Neck: Neck supple. No neck adenopathy.   Cardiovascular: Regular rhythm, S1 normal and S2 normal.   Pulmonary/Chest: Effort normal and breath sounds normal. No respiratory distress. He has no wheezes. He has no rales.   Abdominal: Full and soft. There is no hepatosplenomegaly. There is no tenderness. Musculoskeletal: Normal range of motion.     Neurological: He is alert.   Skin: Skin is warm and moist.       ASSESSMENT/PLAN:  Problem List Items Addressed This Visit        Otologic    Chronic dysfunction of both eustachian tubes    Relevant Orders    SERVICE TO PEDIATRIC ENT [place]      Other Visit Diagnoses     Other recurrent acute nonsuppurative otitis media of right ear    -  Primary    Relevant Orders    SERVICE TO PEDIATRIC [facility]NT [place]    Need for vaccination        Relevant Orders    INFLUENZA QUADRIVALENT SPLIT 0.5 ML VACC MULTIDOSE, IM (FLUAVAL,FLUZONE) (Completed)           Instructed to call if problem worsens or does not improve within the next 24 hours otherwise follow-up.     The father and mother indicated understanding of the diagnosis and agreed with the plan of care.      Magda Estrella MD     Unknown

## 2024-12-03 NOTE — PSYCHIATRIC REHAB INITIAL EVALUATION - NSBHPRRECOMMEND_PSY_ALL_CORE
Writer met with patient to orient to unit and introduce self, psychiatric rehabilitation staff and department functions. Patient was provided a copy of the unit schedule. On interview, patient was polite and cooperative. Patient presented with poor ADLs and appearance. Patient was wrapping himself with a blanket. Patient demonstrated fair insight into his symptoms and treatment at this time. Writer encouraged patient to attend psychiatric rehabilitation groups and engage in treatment. Writer and patient were able to establish a collaborative psychiatric rehabilitation goal. Psychiatric Rehabilitation staff will continue to engage patient daily in order to develop therapeutic rapport.

## 2024-12-03 NOTE — BH INPATIENT PSYCHIATRY ASSESSMENT NOTE - HPI (INCLUDE ILLNESS QUALITY, SEVERITY, DURATION, TIMING, CONTEXT, MODIFYING FACTORS, ASSOCIATED SIGNS AND SYMPTOMS)
29 yr old male, single, domiciled alone and self employed. with self reported hx of depression, anxiety, OCD and ADHD; no reported hx of psych admissions; denied any hx of SA but did have past hx of engaging in NSSIB - none recently; he denied any recent illicit substance use.  as per Psyckes, with multiple psych diagnoses of Major Depressive Disorder, Unspecified/Other Anxiety Disorder,  Generalized Anxiety Disorder, Restless Legs Syndrome  and Unspecified/Other Depressive Disorder.  Pt is currently not in psych care.  today, presented to the ED accompanied by sister due to worsening symptoms of anxiety + depression (predominating symptoms more of anxiety rather than depression).    Patient seen in interview on 1N.  Confirms content from ED assessment and collateral.  Patient states he has never functioned.  He worked very hard in high school to achieve A level grades and then attended [employer].  Once in college, he could not keep up with work because if he did not complete an assignment by a deadlines, there was no one holding him accountable to make it up.  He eventually had to leave school, attended [employer] at that time but then also could not keep up with this work.  He attributes this to ADHD and poor executive function.  States stimulants at times help with this but then stop working and only work at higher doses after a break.  Notes that OCD symptoms have waxed and waned since childhood but the past 1.5 years has been the worst.  Notes uncertainty about mold and mold spores have kept him bedridden and from showering for long periods of time.  His mother who also has anxiety brought him to [place] to try to seek treatment near where she lives.  However, while he was gone, his apartment fell to disrepair with a burst pipe and other issues and he was evicted as a result.  He is currently in the eviction process.  His family staged an intervention to lead him to the hospital.  He states he has numerous med trials and has done ERP but has issues with compliance with both these because he often feels he needs to get better on his own terms.  He denies SI.  Denies current depressed mood.     Per ED assessment:   <<seen bedside.  anxious and dysphoric. claimed that as a child, already had experienced symptoms suggestive of OCD. apart from OCD, also endorses life time hx of depression.  described OCD symptoms experienced - especially recently, as worsening contamination symptoms; other symptoms include "supernatural OCD" - which he described as seeing signs/ symbols - pointing to him "going to hell" (not necessarily believing this since he is not currently Religion). has past hx of engaging in childhood rituals; e.g repeatedly flicking switch for bathroom light - to "scare spiders away"; there was also past hx of compulsive  praying in his younger yrs (was raised a Shinto but currently, he is not Religion).      for the past yr, his predominating OCD symptom is of contamination. specifically, centered on impact of mold spores causing infection/ impact on his health. e.g. cleaned bathroom using vinegar copiously that it led to the bathroom getting destroyed - leading him not to take showers (for almost half a yr) out of fear that if he gets into the shower, he will be "contaminated with the spores".  then relegated to laying in bed for days - not getting up; urinating/ fecal movement on the bed.      regarding his depressive symptoms, described self as "an extremely unhappy person who was always hard on himself". denied attaining any euthymic episode recently. described symptoms as experiencing sad mood daily accompanied by anhedonia; associated symptoms include: poor concentration; sleep disturbances; low energy level. denied any changes to his sleeping pattern. no SI or HI. denied feeling hopeless/ helpless/ worthless    He denied experiencing any signs/ symptoms suggestive of homer (denied grandiosity/ racing thoughts/ increased goal directed activities or engaged in risk taking behavior/ no pressured speech/ no elevated mood/ denied any increased in energy level causing sleep disruption).  no reported perceptual disturbances experienced. he is not paranoid.. denied any thought insertion/ withdrawal/ broadcasting    ** see separate [facility] notes for collateral information obtained from his sister **   (if needs update on Pt's status, can also contact uncle Ariel Alaniz at [phone])>>

## 2024-12-03 NOTE — BH INPATIENT PSYCHIATRY ASSESSMENT NOTE - CURRENT MEDICATION
----- Message from Efra Mcallister NP sent at 2/26/2018 10:58 AM EST -----  Can we get Doc scheduled with Dr. Mercedes Ellsworth for a [address], he is a [facility] patient and Dr. Paul Bernheim doesn't come here. I called Dr. Raman Johnson office and left voice mail with Julia Raygoza to return my call to schedule right heart cath. Julia Raygoza from Dr. Raman Johnson office called and scheduled patient for Wednesday, March 7 for [facility] at 9:30am, he will hold his coumadin for 3 days prior according to their instructions. MEDICATIONS  (STANDING):    MEDICATIONS  (PRN):  acetaminophen     Tablet .. 650 milliGRAM(s) Oral every 6 hours PRN Mild Pain (1 - 3), Moderate Pain (4 - 6)  LORazepam     Tablet 1 milliGRAM(s) Oral every 6 hours PRN Agitation  LORazepam   Injectable 2 milliGRAM(s) IntraMuscular Once PRN severe agitation  melatonin. 3 milliGRAM(s) Oral at bedtime PRN Insomnia  traZODone 50 milliGRAM(s) Oral at bedtime PRN insomnia

## 2024-12-03 NOTE — BH INPATIENT PSYCHIATRY ASSESSMENT NOTE - NSBHMETABOLIC_PSY_ALL_CORE_FT
BMI: BMI (kg/m2): 20.5 (12-03-24 @ 00:54)  HbA1c:   Glucose:   BP: 112/71 (12-02-24 @ 20:52) (112/71 - 125/81)Vital Signs Last 24 Hrs  T(C): 36.6 (12-03-24 @ 00:54), Max: 36.6 (12-03-24 @ 00:54)  T(F): 97.8 (12-03-24 @ 00:54), Max: 97.8 (12-03-24 @ 00:54)  HR: --  BP: --  BP(mean): --  RR: --  SpO2: --    Orthostatic VS  12-03-24 @ 00:54  Lying BP: --/-- HR: --  Sitting BP: 111/69 HR: 107  Standing BP: 106/67 HR: 102  Site: --  Mode: --    Lipid Panel:

## 2024-12-03 NOTE — BH INPATIENT PSYCHIATRY ASSESSMENT NOTE - NSBHASSESSSUMMFT_PSY_ALL_CORE
29M with history of OCD with multiple med trials and ERP in the past, not functioning with very poor hygiene and bed ridden due to obsessions and accompanying avoidance behaviors regarding contamination with invisible contaminants like mold spores.  Reports he seeks voluntary admission due to family intervention.  States he has been frequently noncompliant with past treatment because of need to feel he is improving on his own terms.  Patient appears euthymic and is talkative.  Suspect characterologic component to treatment failures thus far.    Admitted 9.13 status  Routine checks  Discussion of treatment options ongoing, no standing meds  Ativan PRN for agitation  Referred for psychotherapy

## 2024-12-03 NOTE — BH INPATIENT PSYCHIATRY ASSESSMENT NOTE - DESCRIPTION
single and domiciled alone - on the verge of getting evicted; plans to relocate to [place] once he is discharged from [facility]. college drop out.   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 family based in [place] close with twin sister, Yash. when euthymic, likes writing/ drawing, used to play saxophone. raised Synagogue but currently not Jain.  no pets. no reported access to guns

## 2024-12-03 NOTE — PSYCHIATRIC REHAB INITIAL EVALUATION - HOW PATIENT ADDRESSED, PROFILE
"Subjective   Patient ID: Carlos Vallejo is a 74 y.o. male who presents for Med Refill.    HPI  Has DM w/CKD.  Tx w/Jardiance (dose increased 5/26/24 in response to HbA1c of 8.3).  Due for repeat CBC, BMP, HbA1c (previously ordered).  Will need med refill after reviewing results.    Needs form signed for diabetes testing supplies.  Not currently testing his sugars.  Not currently on Insulin.  Will test daily.  Dx: E11.22, N18.31.    Review of Systems  No other complaints.     Objective   /75   Pulse 77   Resp 16   Ht 1.727 m (5' 8\")   Wt 82.4 kg (181 lb 9.6 oz)   SpO2 97%   BMI 27.61 kg/m²     Physical Exam  Constitutional:       General: He is not in acute distress.     Appearance: He is overweight.   Cardiovascular:      Rate and Rhythm: Normal rate and regular rhythm.      Heart sounds: Normal heart sounds. No murmur heard.     No friction rub. No gallop.   Pulmonary:      Effort: Pulmonary effort is normal.      Breath sounds: Normal breath sounds. No wheezing, rhonchi or rales.   Musculoskeletal:      Comments: Ambulating w/cane   Neurological:      Mental Status: He is oriented to person, place, and time.   Psychiatric:         Mood and Affect: Mood normal.         Behavior: Behavior normal.     Assessment/Plan   Diagnoses and all orders for this visit:  Type 2 diabetes mellitus with stage 3a chronic kidney disease, without long-term current use of insulin (Multi)    Get nonfasting labs as previously ordered.  Will refill Jardiance after reviewing lab results.  Form signed for diabetes supplies (test daily).    F/U 3 months: Med refills.  " Jarred

## 2024-12-03 NOTE — BH SOCIAL WORK INITIAL PSYCHOSOCIAL EVALUATION - DETAILS
Pt.'s mother has history of anxiety, psych. admissions  Pt.'s father has history of alcoholism (currently sober)

## 2024-12-04 PROCEDURE — 99232 SBSQ HOSP IP/OBS MODERATE 35: CPT

## 2024-12-04 NOTE — BH INPATIENT PSYCHIATRY PROGRESS NOTE - NSBHASSESSSUMMFT_PSY_ALL_CORE
29M with history of OCD with multiple med trials and ERP in the past, not functioning with very poor hygiene and bed ridden due to obsessions and accompanying avoidance behaviors regarding contamination with invisible contaminants like mold spores.  Reports he seeks voluntary admission due to family intervention.  States he has been frequently noncompliant with past treatment because of need to feel he is improving on his own terms.  Patient appears euthymic and is talkative.  Suspect characterologic component to treatment failures thus far.    Continues to be euthymic but severely impaired by obsessions with poor ADLs.  He is overall social and visible in community.  Considering med options.     Admitted 9.13 status  Routine checks  Discussion of treatment options ongoing, no standing meds  Ativan PRN for agitation  Referred for psychotherapy

## 2024-12-04 NOTE — BH INPATIENT PSYCHIATRY PROGRESS NOTE - NSBHFUPINTERVALHXFT_PSY_A_CORE
Patient seen for follow up of OCD.  I discussed patient's case with the interdisciplinary team.  There were no notable overnight events.  Patient slept well.  Patient is not on standing meds.  Patient gives a very thorough developmental and family history and discusses his childhood at length including relating to his OCD sx and also on his level of function currently.  We have in depth discussion about psychotherapy and pharm treatment and he says he was hoping to avoid taking medications, especially new ones.  Discussed risks and benefits of clomipramine.  He is considering this.

## 2024-12-05 PROCEDURE — 99231 SBSQ HOSP IP/OBS SF/LOW 25: CPT

## 2024-12-05 NOTE — BH INPATIENT PSYCHIATRY PROGRESS NOTE - NSBHASSESSSUMMFT_PSY_ALL_CORE
29M with history of OCD with multiple med trials and ERP in the past, not functioning with very poor hygiene and bed ridden due to obsessions and accompanying avoidance behaviors regarding contamination with invisible contaminants like mold spores.  Reports he seeks voluntary admission due to family intervention.  States he has been frequently noncompliant with past treatment because of need to feel he is improving on his own terms.  Patient appears euthymic and is talkative.  Suspect characterologic component to treatment failures thus far.    Continues to be euthymic but severely impaired by obsessions with poor but improving ADLs.  He is overall social and visible in community.  Considering med options.     Admitted 9.13 status  Routine checks  Discussion of treatment options ongoing, no standing meds  Ativan PRN for agitation  Referred for psychotherapy

## 2024-12-05 NOTE — BH PSYCHOLOGY - CLINICIAN PSYCHOTHERAPY NOTE - NSBHPSYCHOLNARRATIVE_PSY_A_CORE FT
Writer and pt. met for psychotherapy session. The focus of the session was to generate ERP targets that pt. could practice on the unit. Pt. was somewhat receptive to the discussion, and was able to come up with 1 contamination related exposure and 1 scrupulosity related exposure. Pt. also discussed his hesitation around medications, reporting that he has been "difficult" with the psychiatrist. Writer provided psychoeducation around the research data suggesting the combination of ERP and medications as the gold standard for OCD treatment. Pt. was not receptive to this and reported "I just know that medications leave their esther" and was nervous about long-term side effects. Pt. also mentioned that he continues to experience guilt and devotes time to thinking about past times where he did not "do the right thing" such as report pesticide use in a grocery store. Writer offered validation and support and challenged pt. on the idea that even if he did everything "perfectly" things would still be out of his control. Writer assessed what pt. would do with the time he could get back from these mental compulsions, and pt. said "fake hobbies" like "writing, art, and playing music". Writer assigned pt. a homework assignment to write a 5-sentence story about an image by next session, in order to both challenge scrupulosity related concerns around writing for pleasure and encourage engaging in a hobby pt. hopes to enjoy one day.     Patient participated in psychotherapy session. Patient presented with adequate grooming and was casually dressed. Patient maintained appropriate eye contact and demonstrated a cooperative attitude. No abnormal motor movements noted. Patient's mood was dysphoric with constricted and reactive affect. Speech was overinclusive. No perceptual disturbances noted or observed. Patient's thought process was circumstantial. Patient's thought content was significant for guilt about his past behavior with the apartment and worry about [place]. Patient denied suicidal ideation/intent/plan. No HI endorsed. Insight and judgement remain fair. 
Writer and pt. met for psychotherapy session. The focus of the session was information gathering, rapport building, and generating goals for therapy. Pt. was receptive and described a history of OCD, starting in childhood and worsening as he got older with themes ranging from need for symmetry, moral and Spiritism themes, and more recently contamination related concerns. Pt. describes contamination as "most debilitating". Over the past two years pt. has become increasingly avoidant of major areas of his apartment (kitchen, living room) due to a contamination related concern about mold. Pt. describes excessive lengths to clean areas he perceives as contaminated, as well as a concern that the contamination will harm others in addition to himself. Pt. reported that throughout his struggles to manage his OCD his mother has tried to "rescue" him. Pt. describes relationship with his mother as complicated, reporting that "she means well" and that "she uses money and guilt to manipulate me". Pt. describes "hating" his father due to his father's "narcissistic personality" and "alcohol issues". Pt. is concerned about having to move to [place] to live with them after being evicted from his apartment due to neglect from avoidance stemming from OCD. Pt. was able to identify a few areas he'd like to work on: 1) learning to stand up for himself and have more confidence, 2) remembering the "big picture" and preparing for the ways he may have to compromise on his standards when living at home, 3) some "light" ERP while he is here on the unit. Writer offered validation and support.     Patient participated in psychotherapy session. Patient presented as unkempt, not having showered in 2 months and wearing hospital gowns. Patient maintained appropriate eye contact and demonstrated a cooperative attitude. No abnormal motor movements noted. Patient's mood was dysphoric with constricted and reactive affect. Speech was overinclusive. No perceptual disturbances noted or observed. Patient's thought process was circumstantial. Patient's thought content was significant for grief about losing his apartment. Patient denied suicidal ideation/intent/plan. No HI endorsed. Insight and judgement remain poor.

## 2024-12-05 NOTE — BH PSYCHOLOGY - CLINICIAN PSYCHOTHERAPY NOTE - NSTXOBSCOMGOAL_PSY_ALL_CORE
Be able to perform most ADLs and self-care despite obsessions and compulsions
Be able to perform most ADLs and self-care despite obsessions and compulsions

## 2024-12-05 NOTE — BH PSYCHOLOGY - CLINICIAN PSYCHOTHERAPY NOTE - TOKEN PULL-DIAGNOSIS
Primary Diagnosis:    Problem Dx:   MDD (major depressive disorder) [F32.9]      OCD (obsessive compulsive disorder) [F42.9]      
Primary Diagnosis:  Obsessive compulsive disorder [F42.9]        Problem Dx:   MDD (major depressive disorder) [F32.9]      OCD (obsessive compulsive disorder) [F42.9]

## 2024-12-05 NOTE — BH INPATIENT PSYCHIATRY PROGRESS NOTE - NSBHFUPINTERVALHXFT_PSY_A_CORE
Patient seen for follow up of OCD.  I discussed patient's case with the interdisciplinary team.  There were no notable overnight events.  Patient slept well.  Patient is not on standing meds.  Today he is dressed casually rather than in gowns and says he was able to shower.  Notes that in the different environment and with the constant cleaning and wiping he observes on the unit, he is much less concerned about contamination and obsessions have decreased.  I bring up medication again and note that returning to sertraline given past partial response would be an option if he does not wish to take clomipramine, as only side effect he reported on this was anorgasmia which he stated did not trouble him.  He pauses for a long time and looks crestfallen and says he could talk for hours about why he is hesitant to take medication.  We note we will continue discussion and he will continue his consideration.

## 2024-12-06 PROCEDURE — 99231 SBSQ HOSP IP/OBS SF/LOW 25: CPT

## 2024-12-06 RX ORDER — SERTRALINE HYDROCHLORIDE 25 MG/1
25 TABLET ORAL DAILY
Refills: 0 | Status: DISCONTINUED | OUTPATIENT
Start: 2024-12-06 | End: 2024-12-07

## 2024-12-06 NOTE — BH INPATIENT PSYCHIATRY PROGRESS NOTE - NSBHASSESSSUMMFT_PSY_ALL_CORE
29M with history of OCD with multiple med trials and ERP in the past, not functioning with very poor hygiene and bed ridden due to obsessions and accompanying avoidance behaviors regarding contamination with invisible contaminants like mold spores.  Reports he seeks voluntary admission due to family intervention.  States he has been frequently noncompliant with past treatment because of need to feel he is improving on his own terms.  Patient appears euthymic and is talkative.  Suspect characterologic component to treatment failures thus far.    12/06: Patient is preoccupied with his indecision regarding medication. He has difficulty discussing his apprehensions. He is agreeable to the team ordering Sertraline and will decide whether or not he takes it when it is offered.     Admitted 9.13 status  Routine checks  Discussion of treatment options ongoing  ***Sertraline 25mg  Ativan PRN for agitation  Referred for psychotherapy     29M with history of OCD with multiple med trials and ERP in the past, not functioning with very poor hygiene and bed ridden due to obsessions and accompanying avoidance behaviors regarding contamination with invisible contaminants like mold spores.  Reports he seeks voluntary admission due to family intervention.  States he has been frequently noncompliant with past treatment because of need to feel he is improving on his own terms.  Patient appears euthymic and is talkative.  Suspect characterologic component to treatment failures thus far.    12/06: Patient is preoccupied with his indecision regarding medication. He has difficulty discussing his apprehensions. He is agreeable to the team ordering Sertraline and will decide whether or not he takes it when it is offered.     Admitted 9.13 status  Routine checks  Discussion of treatment options ongoing  Sertraline 25mg  Ativan PRN for agitation  Referred for psychotherapy

## 2024-12-06 NOTE — BH INPATIENT PSYCHIATRY PROGRESS NOTE - NSBHATTESTCOMMENTATTENDFT_PSY_A_CORE
Patient is markedly anxious.  He is nevertheless interacting well on the unit.  Has obsessions/ruminations related to taking medications.  Sertraline 25mg ordered.

## 2024-12-06 NOTE — BH INPATIENT PSYCHIATRY PROGRESS NOTE - NSBHFUPINTERVALHXFT_PSY_A_CORE
Chart reviewed. Case discussed with treatment team. Patient seen and examined for f/u of OCD. No acute overnight events, no PRNs required/requested. Compliant with standing medications. Per sleep log, pt slept 7 hours overnight. Per staff, "Pt is visible in the unit, interacts selectively; superficially cooperative; guarded, poorly groomed; still not agreeing to be on  medication regimen. He denies A/VH, denies self injurious thoughts; impulse control-intact."    Pt observed reading in dayroom and agreeable to interview with treatment team. Pt states he had an "alright" day and is feeling the same as yesterday. He appears lost in thought for the entirety of the conversation, with long periods of pausing before responding to the team. He states he has a lot of thinking going into his decision on whether to start medication again or not. The team proposes starting Sertraline at a low dose tomorrow morning and reminds the patient it is his decision to take medication or not. The patient remains apprehensive and perplexed as to whether he will start medication. When asked what he wants to improve or make better during his treatment, the patient explain he want to lead a more "functional" life and not "live in squalor". The team reiterates that the proposed treatment plan is intended to help the patient with these goals. The patient was informed the medication will be ordered for the morning and he is at liberty to refuse it. Chart reviewed. Case discussed with treatment team. Patient seen and examined for f/u of OCD. No acute overnight events, no PRNs required/requested. Compliant with standing medications. Per sleep log, pt slept 7 hours overnight. Per staff, "Pt is visible in the unit, interacts selectively; superficially cooperative; guarded, poorly groomed; still not agreeing to be on  medication regimen. He denies A/VH, denies self injurious thoughts; impulse control-intact."    Pt observed reading in dayroom and agreeable to interview with treatment team. Pt states he had an "alright" day and is feeling the same as yesterday. He appears lost in thought for the entirety of the conversation, with long periods of pausing before responding to the team. He states he has a lot of thinking going into his decision on whether to start medication again or not. The team proposes starting Sertraline at a low dose tomorrow morning and reminds the patient it is his decision to take medication or not. The patient remains apprehensive and perplexed as to whether he will start medication. When asked what he wants to improve or make better during his treatment, the patient explain he want to lead a more "functional" life and not "live in squalor". The team reiterates that the proposed treatment plan is intended to help the patient with these goals. Patient also expresses worry that some irreversible change in his "brain chemistry" will occur if he takes medication.  Example he gives is of people who state they cannot become high anymore after taking an SSRI but states he is not worried about this specifically.  The patient was informed the medication will be ordered for the morning and he is at liberty to refuse it.

## 2024-12-07 PROCEDURE — 99232 SBSQ HOSP IP/OBS MODERATE 35: CPT

## 2024-12-07 RX ORDER — SERTRALINE HYDROCHLORIDE 25 MG/1
25 TABLET ORAL DAILY
Refills: 0 | Status: DISCONTINUED | OUTPATIENT
Start: 2024-12-08 | End: 2024-12-08

## 2024-12-07 RX ORDER — SERTRALINE HYDROCHLORIDE 25 MG/1
25 TABLET ORAL AT BEDTIME
Refills: 0 | Status: COMPLETED | OUTPATIENT
Start: 2024-12-07 | End: 2024-12-07

## 2024-12-07 RX ADMIN — SERTRALINE HYDROCHLORIDE 25 MILLIGRAM(S): 25 TABLET ORAL at 21:36

## 2024-12-07 NOTE — BH INPATIENT PSYCHIATRY PROGRESS NOTE - NSBHFUPINTERVALHXFT_PSY_A_CORE
Chart reviewed. Case discussed with treatment team. Patient seen and examined for f/u of OCD. No acute overnight events, no PRNs required/requested. Compliant with standing medications.     Pt observed socializing with multiple patients, appears animated while on the unit and agreeable to interview in day room. Patient willing to accept Zoloft 25mg po but QHS dosing. He states it was helpful in the past but is hesitant to take it again even though he knows it could help with OCD symptoms. He reports only showering once on the unit since admission and is encouraged to take the medication so that his obsessive thoughts would improve and functioning could improve. Denies SI/I/P at this time. Pt is future oriented as he's reporting that after his hospitalization he has plans to move to his parent's home in [place] so he can find a job there and save money and ultimately return to [place].

## 2024-12-07 NOTE — BH INPATIENT PSYCHIATRY PROGRESS NOTE - NSBHASSESSSUMMFT_PSY_ALL_CORE
29M with history of OCD with multiple med trials and ERP in the past, not functioning with very poor hygiene and bed ridden due to obsessions and accompanying avoidance behaviors regarding contamination with invisible contaminants like mold spores.  Reports he seeks voluntary admission due to family intervention.  States he has been frequently noncompliant with past treatment because of need to feel he is improving on his own terms.  Patient appears euthymic and is talkative.  Suspect characterologic component to treatment failures thus far.    12/06: Patient is preoccupied with his indecision regarding medication. He has difficulty discussing his apprehensions. He is agreeable to the team ordering Sertraline and will decide whether or not he takes it when it is offered.     Admitted 9.13 status  Routine checks  Discussion of treatment options ongoing  Sertraline 25mg - requesting QHS dosing  Ativan PRN for agitation  Referred for psychotherapy

## 2024-12-08 RX ORDER — SERTRALINE HYDROCHLORIDE 25 MG/1
25 TABLET ORAL AT BEDTIME
Refills: 0 | Status: DISCONTINUED | OUTPATIENT
Start: 2024-12-08 | End: 2024-12-09

## 2024-12-08 RX ADMIN — SERTRALINE HYDROCHLORIDE 25 MILLIGRAM(S): 25 TABLET ORAL at 20:34

## 2024-12-09 PROCEDURE — 99231 SBSQ HOSP IP/OBS SF/LOW 25: CPT

## 2024-12-09 RX ORDER — SERTRALINE HYDROCHLORIDE 25 MG/1
50 TABLET ORAL AT BEDTIME
Refills: 0 | Status: DISCONTINUED | OUTPATIENT
Start: 2024-12-09 | End: 2024-12-11

## 2024-12-09 RX ADMIN — SERTRALINE HYDROCHLORIDE 50 MILLIGRAM(S): 25 TABLET ORAL at 20:39

## 2024-12-09 NOTE — BH INPATIENT PSYCHIATRY PROGRESS NOTE - NSBHFUPINTERVALHXFT_PSY_A_CORE
Chart reviewed. Case discussed with treatment team. Patient seen and examined for f/u of OCD. No acute overnight events, no PRNs required/requested. Compliant with standing medications. Patient states he had a good weekend.  Experienced some distress after his mother told him details about her cleaning of his apartment and regretting he touched her as he then felt she was contaminated.  Was relieved by washing his hands.  States contamination concerns are better outside his usual environment.  States he also has been experiencing the most social interaction of any time in past 5 years at least and is enjoying this.  We discuss finding ways to socialize outside the hospital.  Denies med side effects.

## 2024-12-09 NOTE — BH INPATIENT PSYCHIATRY PROGRESS NOTE - NSBHASSESSSUMMFT_PSY_ALL_CORE
29M with history of OCD with multiple med trials and ERP in the past, not functioning with very poor hygiene and bed ridden due to obsessions and accompanying avoidance behaviors regarding contamination with invisible contaminants like mold spores.  Reports he seeks voluntary admission due to family intervention.  States he has been frequently noncompliant with past treatment because of need to feel he is improving on his own terms.  Patient appears euthymic and is talkative.  Suspect characterologic component to treatment failures thus far.    Today he is agreeable to further sertraline titration, is benefiting from socialization.  Discussing seeking [facility] program in [place] for after discharge.    Admitted 9.13 status  Routine checks  Discussion of treatment options ongoing  Sertraline 50mg qHS  Ativan PRN for agitation  ongoing psychotherapy in hospital

## 2024-12-10 PROCEDURE — 99231 SBSQ HOSP IP/OBS SF/LOW 25: CPT

## 2024-12-10 RX ORDER — LORAZEPAM 1 MG/1
2 TABLET ORAL ONCE
Refills: 0 | Status: DISCONTINUED | OUTPATIENT
Start: 2024-12-10 | End: 2024-12-17

## 2024-12-10 RX ORDER — LORAZEPAM 1 MG/1
1 TABLET ORAL EVERY 6 HOURS
Refills: 0 | Status: DISCONTINUED | OUTPATIENT
Start: 2024-12-10 | End: 2024-12-17

## 2024-12-10 RX ADMIN — SERTRALINE HYDROCHLORIDE 50 MILLIGRAM(S): 25 TABLET ORAL at 20:37

## 2024-12-10 NOTE — BH INPATIENT PSYCHIATRY PROGRESS NOTE - NSBHFUPINTERVALHXFT_PSY_A_CORE
Chart reviewed. Case discussed with treatment team. Patient seen and examined for f/u of OCD. No acute overnight events, no PRNs required/requested. Compliant with standing medications. Patient states he had a good weekend.   Denies med side effects.  States he is stressed trying to complete psychotherapy homework.  Also states he is stressed about conversation with his landlord, conveying his belief that the water from his pipes is contaminated after it was not used for 1 year and that he got sick with vomiting after drinking the water despite running it several hours per day for months.  States he feels guilty for having caused this and feels he needs to fix this problem for the next tenant but cannot and ruminates on this.  We discuss dispo to PHP in [place].

## 2024-12-10 NOTE — BH INPATIENT PSYCHIATRY PROGRESS NOTE - NSBHASSESSSUMMFT_PSY_ALL_CORE
29M with history of OCD with multiple med trials and ERP in the past, not functioning with very poor hygiene and bed ridden due to obsessions and accompanying avoidance behaviors regarding contamination with invisible contaminants like mold spores.  Reports he seeks voluntary admission due to family intervention.  States he has been frequently noncompliant with past treatment because of need to feel he is improving on his own terms.  Patient appears euthymic and is talkative.  Suspect characterologic component to treatment failures thus far.    Tolerating sertraline.  Remains with severe obsessions.  Discussing seeking [facility] program in [place] for after discharge.    Admitted 9.13 status  Routine checks  Discussion of treatment options ongoing  Sertraline 50mg qHS  Ativan PRN for agitation  ongoing psychotherapy in hospital

## 2024-12-11 PROCEDURE — 99231 SBSQ HOSP IP/OBS SF/LOW 25: CPT

## 2024-12-11 RX ORDER — SERTRALINE HYDROCHLORIDE 25 MG/1
100 TABLET ORAL AT BEDTIME
Refills: 0 | Status: DISCONTINUED | OUTPATIENT
Start: 2024-12-11 | End: 2024-12-13

## 2024-12-11 RX ADMIN — SERTRALINE HYDROCHLORIDE 50 MILLIGRAM(S): 25 TABLET ORAL at 20:10

## 2024-12-11 NOTE — BH INPATIENT PSYCHIATRY PROGRESS NOTE - NSBHASSESSSUMMFT_PSY_ALL_CORE
29M with history of OCD with multiple med trials and ERP in the past, not functioning with very poor hygiene and bed ridden due to obsessions and accompanying avoidance behaviors regarding contamination with invisible contaminants like mold spores.  Reports he seeks voluntary admission due to family intervention.  States he has been frequently noncompliant with past treatment because of need to feel he is improving on his own terms.  Patient appears euthymic and is talkative.  Suspect characterologic component to treatment failures thus far.    Tolerating sertraline.  Remains with severe obsessions.  Discussing seeking [facility] program in [place] for after discharge.    Admitted 9.13 status  Routine checks  Discussion of treatment options ongoing  Sertraline to 100mg qHS  Ativan PRN for agitation  ongoing psychotherapy in hospital

## 2024-12-11 NOTE — BH INPATIENT PSYCHIATRY PROGRESS NOTE - NSBHFUPINTERVALHXFT_PSY_A_CORE
Chart reviewed. Case discussed with treatment team. Patient seen and examined for f/u of OCD. No acute overnight events, no PRNs required/requested. Compliant with standing medications. Denies side effects.  Still working on therapy homework and did not have session yesterday.  Continues to be social with peers.  Denies actively performing compulsions on the unit.

## 2024-12-12 PROCEDURE — 99231 SBSQ HOSP IP/OBS SF/LOW 25: CPT

## 2024-12-12 RX ADMIN — SERTRALINE HYDROCHLORIDE 100 MILLIGRAM(S): 25 TABLET ORAL at 20:41

## 2024-12-12 NOTE — BH INPATIENT PSYCHIATRY PROGRESS NOTE - NSBHFUPINTERVALHXFT_PSY_A_CORE
Chart reviewed. Case discussed with treatment team. Patient seen and examined for f/u of OCD. No acute overnight events, no PRNs required/requested. Compliant with standing medications. Denies side effects.  Still working on therapy homework and did not have session yesterday.  Continues to be social with peers.  We discuss ways to continue to have social outlets outside the hospital.  Denies actively performing compulsions on the unit. Reiterates only significant time was needing to wipe everything this mother touched after he learned she had been sleeping in his apartment which he believes to be contaminated.

## 2024-12-12 NOTE — BH INPATIENT PSYCHIATRY PROGRESS NOTE - NSBHASSESSSUMMFT_PSY_ALL_CORE
29M with history of OCD with multiple med trials and ERP in the past, not functioning with very poor hygiene and bed ridden due to obsessions and accompanying avoidance behaviors regarding contamination with invisible contaminants like mold spores.  Reports he seeks voluntary admission due to family intervention.  States he has been frequently noncompliant with past treatment because of need to feel he is improving on his own terms.  Patient appears euthymic and is talkative.  Suspect characterologic component to treatment failures thus far.    Tolerating sertraline.  Remains with severe obsessions.  Discussing seeking [facility] program in [place] for after discharge.    Admitted 9.13 status  Routine checks  Discussion of treatment options ongoing  Sertraline to 100mg qHS  Ativan PRN for agitation  ongoing psychotherapy in hospital

## 2024-12-13 PROCEDURE — 99231 SBSQ HOSP IP/OBS SF/LOW 25: CPT

## 2024-12-13 RX ORDER — SERTRALINE HYDROCHLORIDE 25 MG/1
150 TABLET ORAL AT BEDTIME
Refills: 0 | Status: DISCONTINUED | OUTPATIENT
Start: 2024-12-15 | End: 2024-12-17

## 2024-12-13 RX ORDER — SERTRALINE HYDROCHLORIDE 25 MG/1
100 TABLET ORAL AT BEDTIME
Refills: 0 | Status: COMPLETED | OUTPATIENT
Start: 2024-12-13 | End: 2024-12-14

## 2024-12-13 RX ADMIN — SERTRALINE HYDROCHLORIDE 100 MILLIGRAM(S): 25 TABLET ORAL at 21:34

## 2024-12-13 NOTE — BH INPATIENT PSYCHIATRY PROGRESS NOTE - NSBHFUPINTERVALHXFT_PSY_A_CORE
Chart reviewed. Case discussed with treatment team. Patient seen and examined for f/u of OCD. No acute overnight events, no PRNs required/requested. Compliant with standing medications. Denies side effects.  Continues to be social with peers.  Describes "drama" on the unit.  Denies depressed mood or active compulsions.

## 2024-12-13 NOTE — BH INPATIENT PSYCHIATRY PROGRESS NOTE - NSBHASSESSSUMMFT_PSY_ALL_CORE
29M with history of OCD with multiple med trials and ERP in the past, not functioning with very poor hygiene and bed ridden due to obsessions and accompanying avoidance behaviors regarding contamination with invisible contaminants like mold spores.  Reports he seeks voluntary admission due to family intervention.  States he has been frequently noncompliant with past treatment because of need to feel he is improving on his own terms.  Patient appears euthymic and is talkative.  Suspect characterologic component to treatment failures thus far.    Tolerating sertraline.  Remains with severe obsessions.  Discussing seeking [facility] program in [place] for after discharge.    Admitted 9.13 status  Routine checks  Discussion of treatment options ongoing  Sertraline to 100mg qHS, then 150mg starting Sunday HS  Ativan PRN for agitation  ongoing psychotherapy in hospital

## 2024-12-14 RX ADMIN — SERTRALINE HYDROCHLORIDE 100 MILLIGRAM(S): 25 TABLET ORAL at 20:16

## 2024-12-15 RX ADMIN — SERTRALINE HYDROCHLORIDE 150 MILLIGRAM(S): 25 TABLET ORAL at 21:20

## 2024-12-16 PROCEDURE — 99231 SBSQ HOSP IP/OBS SF/LOW 25: CPT

## 2024-12-16 RX ADMIN — SERTRALINE HYDROCHLORIDE 150 MILLIGRAM(S): 25 TABLET ORAL at 20:37

## 2024-12-16 NOTE — BH INPATIENT PSYCHIATRY PROGRESS NOTE - NSBHASSESSSUMMFT_PSY_ALL_CORE
29M with history of OCD with multiple med trials and ERP in the past, not functioning with very poor hygiene and bed ridden due to obsessions and accompanying avoidance behaviors regarding contamination with invisible contaminants like mold spores.  Reports he seeks voluntary admission due to family intervention.  States he has been frequently noncompliant with past treatment because of need to feel he is improving on his own terms.  Patient appears euthymic and is talkative.  Suspect characterologic component to treatment failures thus far.    Tolerating sertraline.  Remains with severe obsessions.  No insight into beliefs about food intolerances being an obsession itself.  Not acting on these beleifs currently.  Discussing seeking [facility] program in [place] for after discharge.    Admitted 9.13 status  Routine checks  Discussion of treatment options ongoing  Sertraline to 100mg qHS, then 150mg starting Sunday HS  Ativan PRN for agitation  ongoing psychotherapy in hospital

## 2024-12-16 NOTE — BH INPATIENT PSYCHIATRY PROGRESS NOTE - NSBHFUPINTERVALHXFT_PSY_A_CORE
Chart reviewed. Case discussed with treatment team. Patient seen and examined for f/u of OCD. No acute overnight events, no PRNs required/requested. Compliant with standing medications. Denies side effects.  Continues to be social with peers.  Patient discusses his perceived sensitivities to foods during discussion of his beliefs about the root causes of his OCD.  Convinced that he reacts very badly to most food items but that avoiding these foods leads to extremes of weight loss in the past.  We discuss treatments like deep TMS (has had TMS for MDD in the past) for OCD and that this is FDA approved.

## 2024-12-17 PROCEDURE — 90853 GROUP PSYCHOTHERAPY: CPT

## 2024-12-17 PROCEDURE — 99231 SBSQ HOSP IP/OBS SF/LOW 25: CPT

## 2024-12-17 RX ORDER — SERTRALINE HYDROCHLORIDE 25 MG/1
200 TABLET ORAL AT BEDTIME
Refills: 0 | Status: DISCONTINUED | OUTPATIENT
Start: 2024-12-18 | End: 2024-12-20

## 2024-12-17 RX ORDER — LORAZEPAM 1 MG/1
1 TABLET ORAL EVERY 6 HOURS
Refills: 0 | Status: DISCONTINUED | OUTPATIENT
Start: 2024-12-17 | End: 2024-12-17

## 2024-12-17 RX ORDER — LORAZEPAM 1 MG/1
2 TABLET ORAL ONCE
Refills: 0 | Status: DISCONTINUED | OUTPATIENT
Start: 2024-12-17 | End: 2024-12-24

## 2024-12-17 RX ADMIN — SERTRALINE HYDROCHLORIDE 150 MILLIGRAM(S): 25 TABLET ORAL at 20:34

## 2024-12-17 NOTE — BH INPATIENT PSYCHIATRY PROGRESS NOTE - NSBHASSESSSUMMFT_PSY_ALL_CORE
29M with history of OCD with multiple med trials and ERP in the past, not functioning with very poor hygiene and bed ridden due to obsessions and accompanying avoidance behaviors regarding contamination with invisible contaminants like mold spores.  Reports he seeks voluntary admission due to family intervention.  States he has been frequently noncompliant with past treatment because of need to feel he is improving on his own terms.  Patient appears euthymic and is talkative.  Suspect characterologic component to treatment failures thus far.    Tolerating sertraline.  Remains with severe obsessions.  No insight into beliefs about food intolerances being an obsession itself.  Not acting on these beleifs currently.  Discussing seeking PHP vs outpatient vs residential program got follow up.    Admitted 9.13 status  Routine checks  Discussion of treatment options ongoing  Sertraline to 100mg qHS, then 150mg starting Sunday HS  Ativan PRN for agitation  ongoing psychotherapy in hospital

## 2024-12-17 NOTE — BH INPATIENT PSYCHIATRY PROGRESS NOTE - NSBHFUPINTERVALHXFT_PSY_A_CORE
Chart reviewed. Case discussed with treatment team. Patient seen and examined for f/u of OCD. No acute overnight events, no PRNs required/requested. Compliant with standing medications. Denies side effects.  Continues to be social with peers.  Patient states he would be open to residential treatment perhaps if we could find one that takes his insurance.

## 2024-12-18 PROCEDURE — 99231 SBSQ HOSP IP/OBS SF/LOW 25: CPT

## 2024-12-18 RX ADMIN — SERTRALINE HYDROCHLORIDE 200 MILLIGRAM(S): 25 TABLET ORAL at 20:05

## 2024-12-18 NOTE — DIETITIAN INITIAL EVALUATION ADULT - PERTINENT MEDS FT
MEDICATIONS  (STANDING):  sertraline 200 milliGRAM(s) Oral at bedtime    MEDICATIONS  (PRN):  acetaminophen     Tablet .. 650 milliGRAM(s) Oral every 6 hours PRN Mild Pain (1 - 3), Moderate Pain (4 - 6)  LORazepam     Tablet 1 milliGRAM(s) Oral every 6 hours PRN Agitation  LORazepam   Injectable 2 milliGRAM(s) IntraMuscular Once PRN severe agitation  melatonin. 3 milliGRAM(s) Oral at bedtime PRN Insomnia  traZODone 50 milliGRAM(s) Oral at bedtime PRN insomnia

## 2024-12-18 NOTE — DIETITIAN INITIAL EVALUATION ADULT - OTHER INFO
Pt is a 28 y/o male with PPHx: OCD. No pertinent medical history.  Admitted to [address] due to worsening of OCD symptoms.  Met Pt in his room. Reports good appetite/po intake at present. No GI distress noted. Denies food allergies.   UBW: 140 lbs. Provided verbal education re: Heathy eating and weight management. Pt verbalized understanding.

## 2024-12-18 NOTE — BH INPATIENT PSYCHIATRY PROGRESS NOTE - NSBHFUPINTERVALHXFT_PSY_A_CORE
Chart reviewed. Case discussed with treatment team. Patient seen and examined for f/u of OCD. No acute overnight events, no PRNs required/requested. Compliant with standing medications. Denies side effects.  Continues to be social with peers.  Discusses perceived side effects including vague sendations in his head including discomfort in the back of his head and sensation like something is being secreted though he states he knows he could not possibly feel such a phenomenon.   Also discusses interest in referral for deep TMS for OCD and says he would hope this could replace meds and would plan to get off meds before starting this.

## 2024-12-18 NOTE — BH INPATIENT PSYCHIATRY PROGRESS NOTE - NSBHASSESSSUMMFT_PSY_ALL_CORE
29M with history of OCD with multiple med trials and ERP in the past, not functioning with very poor hygiene and bed ridden due to obsessions and accompanying avoidance behaviors regarding contamination with invisible contaminants like mold spores.  Reports he seeks voluntary admission due to family intervention.  States he has been frequently noncompliant with past treatment because of need to feel he is improving on his own terms.  Patient appears euthymic and is talkative.  Suspect characterologic component to treatment failures thus far.    Tolerating sertraline.  Remains with severe obsessions.  No insight into beliefs about food intolerances being an obsession itself.  Not acting on these beleifs currently.  Discussing seeking PHP vs outpatient vs residential program for follow up.  Admitted 9.13 status  Routine checks  Discussion of treatment options ongoing  Sertraline to 100mg qHS, then 150mg starting Sunday HS  Ativan PRN for agitation  ongoing psychotherapy in hospital

## 2024-12-19 PROCEDURE — 99231 SBSQ HOSP IP/OBS SF/LOW 25: CPT

## 2024-12-19 PROCEDURE — 90853 GROUP PSYCHOTHERAPY: CPT

## 2024-12-19 RX ADMIN — SERTRALINE HYDROCHLORIDE 200 MILLIGRAM(S): 25 TABLET ORAL at 20:15

## 2024-12-19 NOTE — BH INPATIENT PSYCHIATRY PROGRESS NOTE - NSBHFUPINTERVALHXFT_PSY_A_CORE
Chart reviewed. Case discussed with treatment team. Patient seen and examined for f/u of OCD. No acute overnight events, no PRNs required/requested. Compliant with standing medications. Denies side effects.  Continues to be social with peers.  States he got very poor sleep overnight and was awake in bed because of hearing sounds like banging in the wall.  Speculates this could be the heat.  Staff say there may have been noise emanating from the second floor.  Patient feels tired but says he does not want to nap.  Denies specific side effects to 200mg sertraline dose.

## 2024-12-19 NOTE — BH INPATIENT PSYCHIATRY PROGRESS NOTE - NSBHASSESSSUMMFT_PSY_ALL_CORE
29M with history of OCD with multiple med trials and ERP in the past, not functioning with very poor hygiene and bed ridden due to obsessions and accompanying avoidance behaviors regarding contamination with invisible contaminants like mold spores.  Reports he seeks voluntary admission due to family intervention.  States he has been frequently noncompliant with past treatment because of need to feel he is improving on his own terms.  Patient appears euthymic and is talkative.  Suspect characterologic component to treatment failures thus far.    Tolerating sertraline.  Remains with severe obsessions.      Admitted 9.13 status  Routine checks  Discussion of treatment options ongoing  Sertraline 200mg qHS  Ativan PRN for agitation  ongoing psychotherapy in hospital

## 2024-12-20 PROCEDURE — 99231 SBSQ HOSP IP/OBS SF/LOW 25: CPT

## 2024-12-20 RX ORDER — SERTRALINE HYDROCHLORIDE 25 MG/1
200 TABLET ORAL DAILY
Refills: 0 | Status: DISCONTINUED | OUTPATIENT
Start: 2024-12-20 | End: 2024-12-30

## 2024-12-20 NOTE — BH TREATMENT PLAN - NSTXCAREGIVERPARTICIPATE_PSY_P_CORE
Family/Caregiver participated in identification of needs/problems/goals for treatment/Family/Caregiver participated in development of after care plan

## 2024-12-20 NOTE — BH TREATMENT PLAN - NSTXDCOPLKINTERSW_PSY_ALL_CORE
SW provides suport, psychoed, contact with collaterals, discharge planning and collaboration with treatment team.

## 2024-12-20 NOTE — BH INPATIENT PSYCHIATRY PROGRESS NOTE - NSBHASSESSSUMMFT_PSY_ALL_CORE
COUGH/CONGESTION/FEVER 29M with history of OCD with multiple med trials and ERP in the past, not functioning with very poor hygiene and bed ridden due to obsessions and accompanying avoidance behaviors regarding contamination with invisible contaminants like mold spores.  Reports he seeks voluntary admission due to family intervention.  States he has been frequently noncompliant with past treatment because of need to feel he is improving on his own terms.  Patient appears euthymic and is talkative.  Suspect characterologic component to treatment failures thus far.    Tolerating sertraline.  Remains with severe obsessions.      Admitted 9.13 status  Routine checks  Discussion of treatment options ongoing  Sertraline 200mg now daily  Ativan PRN for agitation  ongoing psychotherapy in hospital

## 2024-12-20 NOTE — BH INPATIENT PSYCHIATRY PROGRESS NOTE - NSBHFUPINTERVALHXFT_PSY_A_CORE
Chart reviewed. Case discussed with treatment team. Patient seen and examined for f/u of OCD. No acute overnight events, no PRNs required/requested. Compliant with standing medications. Denies side effects.  Continues to be social with peers.  Sleep still poor but improved from night before.  No further noise disturbing him.  States he feels a bit restless at night but not like a restless leg feeling he has had before. Agreed to make sertraline dose daily rather than HS.

## 2024-12-20 NOTE — BH TREATMENT PLAN - NSTXOBSCOMINTERPR_PSY_ALL_CORE
Writer met pt to review progress into his psych rehab goal. Pt was calm and cooperative upon approach. Pt was dressed in his own clothes and presented with adequate grooming. Pt continues to make progress into his psych rehab goal of taking care of ADLs despite OCD, pt has been taking care of his ADLs such taking a shower and doing his laundries. Within the group setting, pt continues to be visible and social with select peers. Pt attended 90% of psych rehab groups over the past week. Pt is engaged in group activities and discussions.  Psych rehab staff will continue to support patient via 1:1 engagement and encourage programming attendance in effort to facilitate continued progress towards goal.

## 2024-12-21 RX ADMIN — SERTRALINE HYDROCHLORIDE 200 MILLIGRAM(S): 25 TABLET ORAL at 08:26

## 2024-12-22 RX ADMIN — SERTRALINE HYDROCHLORIDE 200 MILLIGRAM(S): 25 TABLET ORAL at 08:27

## 2024-12-23 PROCEDURE — 99231 SBSQ HOSP IP/OBS SF/LOW 25: CPT

## 2024-12-23 RX ADMIN — SERTRALINE HYDROCHLORIDE 200 MILLIGRAM(S): 25 TABLET ORAL at 08:10

## 2024-12-23 NOTE — BH INPATIENT PSYCHIATRY PROGRESS NOTE - NSBHFUPINTERVALHXFT_PSY_A_CORE
Chart reviewed. Case discussed with treatment team. Patient seen and examined for f/u of OCD. No acute overnight events, no PRNs required/requested. Compliant with standing medications. Denies side effects.  Continues to be social with peers.  Reports a better night sleep last night.  Believes changing to daily dosing of sertraline has improved this.  States he does not want to take medication but mostly does it to be compliant and please the team.  We discuss how he will assess if medication is having an effect as an outpatient.  He also discusses some past obsessions such as believing his parents' garage floor had been contaminated with lead and sulfuric acid which would spread to things this touched.  We reason over how harmful such indirect contact could be and how his "safe area" of the house shrinks over time.

## 2024-12-23 NOTE — BH INPATIENT PSYCHIATRY PROGRESS NOTE - NSBHASSESSSUMMFT_PSY_ALL_CORE
29M with history of OCD with multiple med trials and ERP in the past, not functioning with very poor hygiene and bed ridden due to obsessions and accompanying avoidance behaviors regarding contamination with invisible contaminants like mold spores.  Reports he seeks voluntary admission due to family intervention.  States he has been frequently noncompliant with past treatment because of need to feel he is improving on his own terms.  Patient appears euthymic and is talkative.  Suspect characterologic component to treatment failures thus far.    Tolerating sertraline.  Remains with severe obsessions.      Admitted 9.13 status  Routine checks  Discussion of treatment options ongoing  Sertraline 200mg now daily  Ativan PRN for agitation  ongoing psychotherapy in hospital

## 2024-12-24 PROCEDURE — 99232 SBSQ HOSP IP/OBS MODERATE 35: CPT

## 2024-12-24 RX ADMIN — SERTRALINE HYDROCHLORIDE 200 MILLIGRAM(S): 25 TABLET ORAL at 08:31

## 2024-12-24 NOTE — BH INPATIENT PSYCHIATRY PROGRESS NOTE - NSBHFUPINTERVALHXFT_PSY_A_CORE
Chart reviewed. Case discussed with treatment team. Patient seen and examined for f/u of OCD. No acute overnight events, no PRNs required/requested. Compliant with standing medications. Believes medications are helping and endorsed self discontinuation of medications prior to hospitalizations which led to worsening of symptoms. Denies side effects. Continues to be social with peers. Patient reported feeling tired "always tired", believed safety checks and disruptive patients are affecting his sleep. Patient reported improvement of OCD symptoms because "there is nothing to clean, I have distractions and this is a controlled environment."  Patient does endorse obsessive thoughts around rika room where hs needs to change his socks and wash his hands after partaking in activities in the room due to someone saying it nidhi like mold. Patient also reported persistent need to wash his hands after touching a perceived contaminated items or something that touched a contaminated item. Patient expressed concern about a yoga mat that tough sneakers that his Mother touched after she cleaned up his apartment implying his Mother is contaminated. Patient discussed plan to move with parents in [place] and is feeling dread surround this idea due to difficult relationship with them. He denied SIIP, HIIP, AVH.

## 2024-12-25 RX ADMIN — SERTRALINE HYDROCHLORIDE 200 MILLIGRAM(S): 25 TABLET ORAL at 08:19

## 2024-12-26 PROCEDURE — 90853 GROUP PSYCHOTHERAPY: CPT

## 2024-12-26 PROCEDURE — 99232 SBSQ HOSP IP/OBS MODERATE 35: CPT

## 2024-12-26 RX ADMIN — SERTRALINE HYDROCHLORIDE 200 MILLIGRAM(S): 25 TABLET ORAL at 09:37

## 2024-12-26 NOTE — BH INPATIENT PSYCHIATRY PROGRESS NOTE - NSBHFUPINTERVALHXFT_PSY_A_CORE
Chart reviewed. Case discussed with treatment team. Patient seen and examined for f/u of OCD. No acute overnight events, no PRNs required/requested. Compliant with standing medications. Patient was adherent with medications, denied side effects. Patient was unkempt, hair disheveled, dulvulged he has not showered "in a few days", but plans to shower later today. Patient endorsed difficulty with showering. Discussed titrating medication, patient wants to wait for primary provider to decide. He denied SIIP, HIIP, AVH.

## 2024-12-27 PROCEDURE — 99232 SBSQ HOSP IP/OBS MODERATE 35: CPT

## 2024-12-27 RX ADMIN — SERTRALINE HYDROCHLORIDE 200 MILLIGRAM(S): 25 TABLET ORAL at 08:09

## 2024-12-27 NOTE — BH INPATIENT PSYCHIATRY PROGRESS NOTE - NSBHFUPINTERVALHXFT_PSY_A_CORE
Chart reviewed. Case discussed with treatment team. Patient seen and examined for f/u of OCD. No acute overnight events, no PRNs required/requested. Compliant with standing medications, denied side effects. Patient showered last night and plans to shower again today after exercising. Patient expressed concern about body odor but is unable to use hospital supplied antiperspirant because the one time he used it "he tasted it" in his mouth, believes it absorbed from his axillary to his taste buds. Patient does endorse how strange this sounds but believes he did experience it. Suggested he have family send a deodorant but he expressed he can because he still believes Mom is contaminated from cleaning out his apartment. Patient reported feeling "good" and "well rested today, believes it is a "fluke" and only happens a couple of times a year and is unexplained. He denied SIIP, HIIP, AVH.

## 2024-12-28 RX ADMIN — SERTRALINE HYDROCHLORIDE 200 MILLIGRAM(S): 25 TABLET ORAL at 08:20

## 2024-12-29 RX ADMIN — SERTRALINE HYDROCHLORIDE 200 MILLIGRAM(S): 25 TABLET ORAL at 08:18

## 2024-12-30 PROCEDURE — 99231 SBSQ HOSP IP/OBS SF/LOW 25: CPT

## 2024-12-30 RX ORDER — SERTRALINE HYDROCHLORIDE 25 MG/1
250 TABLET ORAL DAILY
Refills: 0 | Status: DISCONTINUED | OUTPATIENT
Start: 2024-12-30 | End: 2025-01-03

## 2024-12-30 RX ADMIN — SERTRALINE HYDROCHLORIDE 200 MILLIGRAM(S): 25 TABLET ORAL at 09:14

## 2024-12-30 NOTE — BH INPATIENT PSYCHIATRY PROGRESS NOTE - NSBHASSESSSUMMFT_PSY_ALL_CORE
29M with history of OCD with multiple med trials and ERP in the past, not functioning with very poor hygiene and bed ridden due to obsessions and accompanying avoidance behaviors regarding contamination with invisible contaminants like mold spores.  Reports he seeks voluntary admission due to family intervention.  States he has been frequently noncompliant with past treatment because of need to feel he is improving on his own terms.  Patient appears euthymic and is talkative.  Suspect characterologic component to treatment failures thus far.    Tolerating sertraline. Remains with severe obsessions.      Admitted 9.13 status  Routine checks  Discussion of treatment options ongoing  Sertraline to 250mg daily  Ativan PRN for agitation  ongoing psychotherapy in hospital

## 2024-12-30 NOTE — BH INPATIENT PSYCHIATRY PROGRESS NOTE - NSBHFUPINTERVALHXFT_PSY_A_CORE
Chart reviewed. Case discussed with treatment team. Patient seen and examined for f/u of OCD. No acute overnight events, no PRNs required/requested. Compliant with standing medications, denied side effects. Patient initially led to one interview room but requests to use the other.  When asked why he explains that a staff member stated that they thought the one room smelled like mildew and now he believes it is contaminated.  He says he still uses it when group is held there or people are playing games, but reluctantly so and only if there is not another alternative.  We discuss how this could act as an exposure.  We discuss increasing sertraline for better efficacy.  He is reluctant but agrees to this trial.  Asks about discharge and we discuss involving his family in discharge planning but he doubts that will have any leads on centers in [place] area.

## 2024-12-31 PROCEDURE — 99231 SBSQ HOSP IP/OBS SF/LOW 25: CPT

## 2024-12-31 RX ORDER — LORAZEPAM 1 MG/1
1 TABLET ORAL EVERY 6 HOURS
Refills: 0 | Status: DISCONTINUED | OUTPATIENT
Start: 2024-12-31 | End: 2025-01-07

## 2024-12-31 RX ORDER — LORAZEPAM 1 MG/1
2 TABLET ORAL ONCE
Refills: 0 | Status: DISCONTINUED | OUTPATIENT
Start: 2024-12-31 | End: 2025-01-07

## 2024-12-31 RX ADMIN — SERTRALINE HYDROCHLORIDE 250 MILLIGRAM(S): 25 TABLET ORAL at 08:26

## 2024-12-31 NOTE — BH INPATIENT PSYCHIATRY PROGRESS NOTE - NSBHFUPINTERVALHXFT_PSY_A_CORE
Chart reviewed. Case discussed with treatment team. Patient seen and examined for f/u of OCD. No acute overnight events, no PRNs required/requested. Compliant with standing medications, denied side effects. Patient accepts going into room where staff said they smelled mildew and which he avoids.  He states he feels okay coming in if asks and feels like contamination has already occurred but it is not that significant overall.  States when he has outside motivators, he can do what is necessary despite obsessive thoughts.  States he has been brainstorming in notebooks what could motivate him when in [place] with family.  Asks about dispo, but states he did not approach SW today about this because he was focused on "trying to keep [another patient] from being discharged today".

## 2024-12-31 NOTE — BH INPATIENT PSYCHIATRY PROGRESS NOTE - NSBHASSESSSUMMFT_PSY_ALL_CORE
29M with history of OCD with multiple med trials and ERP in the past, not functioning with very poor hygiene and bed ridden due to obsessions and accompanying avoidance behaviors regarding contamination with invisible contaminants like mold spores.  Reports he seeks voluntary admission due to family intervention.  States he has been frequently noncompliant with past treatment because of need to feel he is improving on his own terms.  Patient appears euthymic and is talkative.  Suspect characterologic component to treatment failures thus far.    Tolerating sertraline. Remains with severe obsessions.      Admitted 9.13 status  Routine checks  Discussion of treatment options ongoing  Sertraline 250mg daily  Ativan PRN for agitation  ongoing psychotherapy in hospital

## 2025-01-01 RX ADMIN — SERTRALINE HYDROCHLORIDE 250 MILLIGRAM(S): 25 TABLET ORAL at 08:08

## 2025-01-02 PROCEDURE — 90853 GROUP PSYCHOTHERAPY: CPT

## 2025-01-02 PROCEDURE — 99231 SBSQ HOSP IP/OBS SF/LOW 25: CPT

## 2025-01-02 RX ADMIN — SERTRALINE HYDROCHLORIDE 250 MILLIGRAM(S): 25 TABLET ORAL at 09:14

## 2025-01-02 NOTE — BH DISCHARGE NOTE NURSING/SOCIAL WORK/PSYCH REHAB - DISCHARGE INSTRUCTIONS AFTERCARE APPOINTMENTS
In order to check the location, date, or time of your aftercare appointment, please refer to your Discharge Instructions Document given to you upon leaving the hospital.  If you have lost the instructions please call [phone]

## 2025-01-02 NOTE — BH DISCHARGE NOTE NURSING/SOCIAL WORK/PSYCH REHAB - NSDCPRGOAL_PSY_ALL_CORE
Writer met with patient in order to discuss progress towards goal upon discharge. Pt has made fair progress, as patient has identified going for a walk and exposure as positive coping skills.  Writer provided support and encouraged patient to continue utilizing coping skills post discharge. Pt was receptive.

## 2025-01-02 NOTE — BH DISCHARGE NOTE NURSING/SOCIAL WORK/PSYCH REHAB - OTHER MODE OF TRANSPORTATION
Taxi to airport. Flight to [place] [facility] to [place]

## 2025-01-02 NOTE — BH PSYCHOLOGY - GROUP THERAPY NOTE - NSPSYCHOLGRPCOGPT_PSY_A_CORE FT
Patient attended recovery oriented/acceptance & commitment therapy group. Group engaged in check-in prompt – “What’s a food that reminds you of a positive memory.” The group then focused on topics of acceptance, cognitive defusion, and values. Patients completed several prompts which encouraged a values exploration. Pts shared examples of qualities they admired in people close to them, qualities they would like to nurture in themselves, and what they would most want to do with their time.  facilitated discussion of concepts, encouraged active participation, and supported members providing feedback to peers.   
Patient attended recovery oriented/acceptance & commitment therapy group. The group started with the brief check in question: "What are some ideas for future groups?". The group then focused on topics of acceptance, values, and compassion. Patients completed the values domain assessment and shared examples of areas of their lives where things were going well, and areas that they would like to work on.  facilitated discussion of concepts, encouraged active participation, and supported members providing feedback to peers.  
Patient attended recovery oriented/acceptance & commitment therapy group. The group started with the brief check in question: "What is one change in yourself you've noticed in the past month. The group then focused on topics of acceptance, cognitive defusion, and compassion. Patients responded to discussion prompts around "What makes relationships hard?", "What is the cost of avoiding people?", "What makes it worth taking risks in relationships despite difficult or uncomfortable experiences?". Pt shared examples of the ways in which stigma and rejection get in the way, the cost of avoidance as loneliness and sadness, and the need for social support and relating on a human level as what makes it worth it.  facilitated discussion of concepts, encouraged active participation, and supported members providing feedback to peers. 
Patient attended recovery oriented/acceptance & commitment therapy group. Group started with check in prompt to increase present moment awareness – “Name something good in your life.” Members discussed family members, meaningful activities like yoga/reading, and looking forward to future celebrations. Facilitator encouraged use of gratitude practice for members who find it helpful. Group then focused on topics of acceptance and mindfulness. Through origami making, members experienced a variety of urges, sensations, thoughts, feelings and simultaneously, engaged in a new activity (creating an origami box). Members reflected on experiencing anticipation and discomfort in the service of a goal (making a box).  facilitated discussion of concepts, encouraged active participation, and supported members providing feedback to peers.
Patient attended recovery oriented/acceptance & commitment therapy group. The group started with the brief check in question: "What is one book/movie/song that you wish you could read/watch/hear for the first time again?". The group then focused on topics of mindfulness, cognitive defusion, and compassion. Patients asked to go outside and following a majority vote, the last 15 minutes were spent engaging in "Mindful basketball" where pts were asked to share what they hear/see/feel/smell/taste before taking a shot. Pts. shared examples of sounds around the hospital they heard, aspects of nature and buildings they saw, and noticing feeling the cold.  facilitated discussion of concepts, encouraged active participation, and supported members providing feedback to peers.  The group concluded with a final stretch before returning inside. 
Patient attended recovery oriented/acceptance & commitment therapy group. The group started with the brief check in question: "How present do you feel in this moment from 0-10?" The group then focused on topic of self-compassion. Members shared their associations with the word "compassion" such as being gentle, showing kindness, and treating ourselves the way we treat close friends. Members also reflected on barriers to practice of self-compassion including thoughts like "I am undeserving of this treatment" or "There are far worse problems in the world and my issues are not worthy of kind treatment." Members then reviewed a self-compassion handout containing self-compassionate statements.  facilitated discussion of concepts, encouraged active participation, and supported members providing feedback to peers.

## 2025-01-02 NOTE — BH PSYCHOLOGY - GROUP THERAPY NOTE - NSPSYCHOLGRPCOGPT_PSY_A_CORE
participated in exercise/shared negative coping experience/shared positive coping experience/gave feedback to others/other...
participated in exercise/shared negative coping experience/shared positive coping experience/gave feedback to others/other...
participated in exercise/shared negative coping experience/shared positive coping experience/guarded/gave feedback to others/other...
participated in exercise/shared negative coping experience/shared positive coping experience/gave feedback to others/other...
participated in exercise/shared negative coping experience/shared positive coping experience/guarded/gave feedback to others/other...
participated in exercise/shared negative coping experience/shared positive coping experience/guarded/gave feedback to others/other...

## 2025-01-02 NOTE — BH PSYCHOLOGY - GROUP THERAPY NOTE - NSBHPSYCHOLRESPONSE_PSY_A_CORE
Coping skills acquired/Accepted support

## 2025-01-02 NOTE — BH PSYCHOLOGY - GROUP THERAPY NOTE - NSBHPSYCHOLPARTICIPCOMMENT_PSY_A_CORE FT
Pt. engaged appropriately in group. Pt. was alert and oriented throughout group. During check-in, pt. shared that he would like to watch "Lord of the Rings" or "Interstellar" for the first time again. Pt. participated in the mindful basketball exercise and discussion. Pt. shared his reflections and listened as others shared their experiences. 
Pt. engaged appropriately in group for the most part, though was intrusive and offensive toward other patients at times. Pt. was alert and oriented throughout group. During check-in, pt. chose to "pass". Pt. participated in the discussion on relationships and socialization. Pt. shared his reflections and listened as others shared their experiences. 
Pt. engaged appropriately in group. Pt. was alert and oriented throughout group. During check-in, pt. shared "I've never been to groups before, so I don't know". Pt. participated in the valued domains assessment and discussion, expressing that he was "not surprised" by the discrepancies between importance and satisfaction. Pt. shared his reflections and listened as others shared their experiences. 
Pt. engaged appropriately in group. Pt. was alert and oriented throughout group. During check-in, pt. shared a memory of eating ramen in [place] with a close friend. During values discussion, pt listened to peers and completed Values Exploration worksheet. 
Pt. engaged appropriately in group. He was engaged and alert. At check in, pt rated his present-moment awareness at "5 or 6/10." He actively contributed to discussion on self-compassion, sharing times in his past when he pushed away compassion from others. Pt listened as peers shared their reactions and experiences. 
Pt. engaged appropriately in group. He was engaged and alert. At check in, pt reported that the meditation group was something good in his life. He participated in mindful origami activity, noticing thoughts like "Are you trying to test our frustration here?" and with those thoughts, pt was still able to participate meaningfully.

## 2025-01-02 NOTE — BH PSYCHOLOGY - GROUP THERAPY NOTE - NSPSYCHOLGRPCOGSPCH_PSY_A_CORE FT
within normal limits

## 2025-01-02 NOTE — BH PSYCHOLOGY - GROUP THERAPY NOTE - NSBHPTASSESSDT_PSY_A_CORE
02-Jan-2025 11:15
03-Dec-2024 11:15
17-Dec-2024 11:15
05-Dec-2024 11:15
26-Dec-2024 11:15
19-Dec-2024 11:15

## 2025-01-02 NOTE — BH PSYCHOLOGY - GROUP THERAPY NOTE - NSPSYCHOLGRPCOGPROB_PSY_A_CORE FT
Anxiety, Depression, Emotion dysregulation, Lack of coping skills 

## 2025-01-02 NOTE — BH PSYCHOLOGY - GROUP THERAPY NOTE - NSPSYCHOLGRPCOGAFCT_PSY_A_CORE FT
constricted 
somewhat irritable
congruent
constricted 
congruent, though at times irritable
congruent

## 2025-01-02 NOTE — BH DISCHARGE NOTE NURSING/SOCIAL WORK/PSYCH REHAB - NSDCADDINFO1FT_PSY_ALL_CORE
SW contacted [facility].  A member of the admissions team (Reggie) will reach out to you as you transition to [place], and will provide support until you have active [place] medicaid. [facility] is in network with [facility], [facility] [place] via [employer] [facility].  They can probably work out a payment plan until your [place] Medicaid is active if you are interested. SW contacted [facility].  A member of the admissions team (Reggie) will reach out to you as you transition to [place], and will provide support until you have active [place] medicaid. [facility] is in network with [facility], [facility] via [employer] [facility].  They can probably work out a payment plan until your [place] Medicaid is active if you are interested. The above appointment is tentative. When you have active Medicaid, call Reggie at [facility] and they will work out a virtual appointment with you.

## 2025-01-02 NOTE — BH DISCHARGE NOTE NURSING/SOCIAL WORK/PSYCH REHAB - PATIENT PORTAL LINK FT
You can access the FollowMyHealth Patient Portal offered by [facility] by registering at the following website: [email or web address]. By joining [facility]’s FollowMyHealth portal, you will also be able to view your health information using other applications (apps) compatible with our system.

## 2025-01-02 NOTE — BH DISCHARGE NOTE NURSING/SOCIAL WORK/PSYCH REHAB - CAREGIVER PHONE NUMBER
[phone] [As Noted in HPI] : as noted in HPI [Breast Pain] : no breast pain [Breast Lump] : no breast lump [Negative] : Heme/Lymph

## 2025-01-02 NOTE — BH PSYCHOLOGY - GROUP THERAPY NOTE - NSPSYCHOLGRPBILLING_PSY_A_CORE
[id number] - Group Psychotherapy
82512 - Group Psychotherapy
[id number] - Group Psychotherapy
[id number] - Group Psychotherapy

## 2025-01-02 NOTE — BH PSYCHOLOGY - GROUP THERAPY NOTE - NSBHPSYCHOLASSESSPROV_PSY_A_CORE
Psychology Trainee only
Licensed Psychologist
Licensed Psychologist and Psychology Trainee
Psychology Trainee only
Licensed Psychologist
Licensed Psychologist and Psychology Trainee

## 2025-01-02 NOTE — BH INPATIENT PSYCHIATRY PROGRESS NOTE - NSBHFUPINTERVALHXFT_PSY_A_CORE
Chart reviewed. Case discussed with treatment team. Patient seen and examined for f/u of OCD. No acute overnight events, no PRNs required/requested. Compliant with standing medications, denied side effects. Patient states he feels some difference on higher dose sertraline into obsessive thoughts.  However, notes other sensations which have stopped and others that have begun which makes him worry and feel uncertain about whether an irreversible change is happening in his brain.  Nevertheless he remains open to taking medication.  Continues to be social on the unit and to engage in activities.

## 2025-01-03 PROCEDURE — 99231 SBSQ HOSP IP/OBS SF/LOW 25: CPT

## 2025-01-03 RX ORDER — SERTRALINE HYDROCHLORIDE 25 MG/1
300 TABLET ORAL DAILY
Refills: 0 | Status: DISCONTINUED | OUTPATIENT
Start: 2025-01-03 | End: 2025-01-07

## 2025-01-03 RX ADMIN — SERTRALINE HYDROCHLORIDE 250 MILLIGRAM(S): 25 TABLET ORAL at 08:05

## 2025-01-03 NOTE — BH INPATIENT PSYCHIATRY PROGRESS NOTE - NSBHASSESSSUMMFT_PSY_ALL_CORE
29M with history of OCD with multiple med trials and ERP in the past, not functioning with very poor hygiene and bed ridden due to obsessions and accompanying avoidance behaviors regarding contamination with invisible contaminants like mold spores.  Reports he seeks voluntary admission due to family intervention.  States he has been frequently noncompliant with past treatment because of need to feel he is improving on his own terms.  Patient appears euthymic and is talkative.  Suspect characterologic component to treatment failures thus far.    Tolerating sertraline. Remains with severe obsessions.      Admitted 9.13 status  Routine checks  Discussion of treatment options ongoing  Sertraline to 300mg daily  Ativan PRN for agitation  ongoing psychotherapy in hospital

## 2025-01-03 NOTE — BH INPATIENT PSYCHIATRY PROGRESS NOTE - NSBHFUPINTERVALHXFT_PSY_A_CORE
Chart reviewed. Case discussed with treatment team. Patient seen and examined for f/u of OCD. No acute overnight events, no PRNs required/requested. Compliant with standing medications, denied side effects. Patient continues to be social on the unit and to engage in activities. He is agreeable to further increase in sertraline dose.  We discuss option for follow up treatment in [place] found by SW and says he will discuss with his family.  Feels ambivalent about having to live with his family again.

## 2025-01-04 RX ADMIN — SERTRALINE HYDROCHLORIDE 300 MILLIGRAM(S): 25 TABLET ORAL at 09:21

## 2025-01-05 RX ADMIN — SERTRALINE HYDROCHLORIDE 300 MILLIGRAM(S): 25 TABLET ORAL at 08:08

## 2025-01-06 PROCEDURE — 99231 SBSQ HOSP IP/OBS SF/LOW 25: CPT

## 2025-01-06 RX ADMIN — SERTRALINE HYDROCHLORIDE 300 MILLIGRAM(S): 25 TABLET ORAL at 09:36

## 2025-01-06 NOTE — BH SAFETY PLAN - WARNING SIGN 2
Returning to a compulsion more than three times Full range of motion of upper and lower extremities, no joint tenderness/swelling.

## 2025-01-06 NOTE — BH INPATIENT PSYCHIATRY PROGRESS NOTE - NSBHASSESSSUMMFT_PSY_ALL_CORE
29M with history of OCD with multiple med trials and ERP in the past, not functioning with very poor hygiene and bed ridden due to obsessions and accompanying avoidance behaviors regarding contamination with invisible contaminants like mold spores.  Reports he seeks voluntary admission due to family intervention.  States he has been frequently noncompliant with past treatment because of need to feel he is improving on his own terms.  Patient appears euthymic and is talkative.  Suspect characterologic component to treatment failures thus far.    Tolerating sertraline. Remains with severe obsessions but improving.      Admitted 9.13 status  Routine checks  Discussion of treatment options ongoing  Sertraline 300mg daily  Ativan PRN for agitation  ongoing psychotherapy in hospital

## 2025-01-06 NOTE — BH INPATIENT PSYCHIATRY PROGRESS NOTE - NSBHFUPINTERVALHXFT_PSY_A_CORE
Chart reviewed. Case discussed with treatment team. Patient seen and examined for f/u of OCD. No acute overnight events, no PRNs required/requested. Compliant with standing medications, denied side effects. Patient continues to be social on the unit and to engage in activities. Complains about agitation on the unit.  Feels reluctant to be discharged but agrees there should be no barrier to being discharged this week.  Feels obsessions are managed on meds a bit.

## 2025-01-07 PROCEDURE — 99231 SBSQ HOSP IP/OBS SF/LOW 25: CPT

## 2025-01-07 RX ORDER — SERTRALINE HYDROCHLORIDE 25 MG/1
7 TABLET ORAL
Qty: 210 | Refills: 0
Start: 2025-01-07 | End: 2025-02-05

## 2025-01-07 RX ORDER — LORAZEPAM 1 MG/1
2 TABLET ORAL ONCE
Refills: 0 | Status: DISCONTINUED | OUTPATIENT
Start: 2025-01-07 | End: 2025-01-09

## 2025-01-07 RX ORDER — LORAZEPAM 1 MG/1
1 TABLET ORAL EVERY 6 HOURS
Refills: 0 | Status: DISCONTINUED | OUTPATIENT
Start: 2025-01-07 | End: 2025-01-09

## 2025-01-07 RX ORDER — GINKGO BILOBA 40 MG
1 CAPSULE ORAL
Qty: 30 | Refills: 0
Start: 2025-01-07 | End: 2025-02-05

## 2025-01-07 RX ORDER — TRAZODONE HYDROCHLORIDE 150 MG/1
1 TABLET ORAL
Qty: 30 | Refills: 0
Start: 2025-01-07 | End: 2025-02-05

## 2025-01-07 RX ORDER — SERTRALINE HYDROCHLORIDE 25 MG/1
350 TABLET ORAL DAILY
Refills: 0 | Status: DISCONTINUED | OUTPATIENT
Start: 2025-01-07 | End: 2025-01-09

## 2025-01-07 RX ADMIN — SERTRALINE HYDROCHLORIDE 300 MILLIGRAM(S): 25 TABLET ORAL at 08:11

## 2025-01-07 NOTE — BH INPATIENT PSYCHIATRY PROGRESS NOTE - NSBHASSESSSUMMFT_PSY_ALL_CORE
29M with history of OCD with multiple med trials and ERP in the past, not functioning with very poor hygiene and bed ridden due to obsessions and accompanying avoidance behaviors regarding contamination with invisible contaminants like mold spores.  Reports he seeks voluntary admission due to family intervention.  States he has been frequently noncompliant with past treatment because of need to feel he is improving on his own terms.  Patient appears euthymic and is talkative.  Suspect characterologic component to treatment failures thus far.    Tolerating sertraline. Remains with severe obsessions but improving.      Admitted 9.13 status  Routine checks  Discussion of treatment options ongoing  Sertraline to 350mg daily  Ativan PRN for agitation  ongoing psychotherapy in hospital

## 2025-01-07 NOTE — BH INPATIENT PSYCHIATRY PROGRESS NOTE - NSBHFUPINTERVALHXFT_PSY_A_CORE
Chart reviewed. Case discussed with treatment team. Patient seen and examined for f/u of OCD. No acute overnight events, no PRNs required/requested. Compliant with standing medications, denied side effects. Patient continues to be social on the unit and to engage in activities. Feels obsessions have lessened.  Willing to increase sertraline dose further after consideration.  States he has a flight on Thursday.  Will coordinate discharge to make flight.

## 2025-01-08 PROCEDURE — 99231 SBSQ HOSP IP/OBS SF/LOW 25: CPT

## 2025-01-08 RX ADMIN — SERTRALINE HYDROCHLORIDE 350 MILLIGRAM(S): 25 TABLET ORAL at 08:34

## 2025-01-08 NOTE — BH INPATIENT PSYCHIATRY PROGRESS NOTE - MSE UNSTRUCTURED FT
Conscious, cooperative, alert.   A bit unkempt but no major hygiene concerns  No psychomotor agitation/retardation.   Fair eye contact.   Speech: regular rate and rhythm.  Mood: "okay"   Affect: neutral, constricted  Thought Process: linear, goal directed   Thought Content: Contamination Obsessions ongoing. No Suicidal ideation/intent/plan, No homicidal ideation/intent/plan. No delusions   Perception: No hallucinations   Insight and Judgement: fair  Impulse Control: fair at this time.    
Conscious, cooperative, alert.   A bit unkempt but no major hygiene concerns  No psychomotor agitation/retardation.   Fair eye contact.   Speech: regular rate and rhythm.  Mood: "okay"   Affect: neutral, constricted  Thought Process: linear, goal directed   Thought Content: Contamination Obsessions ongoing. No Suicidal ideation/intent/plan, No homicidal ideation/intent/plan. No delusions   Perception: No hallucinations   Insight and Judgement: fair  Impulse Control: fair at this time.    
Conscious, cooperative, alert.   No psychomotor agitation/retardation.   Poor eye contact.   Speech: regular rate and rhythm, significant pausing before responses.   Mood: "okay"   Affect: anxious, constricted  Thought Process: linear, goal directed   Thought Content: Contamination Obsessions attenuating. No Suicidal ideation/intent/plan, No homicidal ideation/intent/plan. No delusions   Perception: No hallucinations   Insight and Judgement: fair  Impulse Control: fair at this time.    
Conscious, cooperative, alert.   A bit unkempt but no major hygiene concerns  No psychomotor agitation/retardation.   Fair eye contact.   Speech: regular rate and rhythm, significant pausing before responses.   Mood: "okay"   Affect: neutral, constricted  Thought Process: linear, goal directed   Thought Content: Contamination Obsessions attenuating. No Suicidal ideation/intent/plan, No homicidal ideation/intent/plan. No delusions   Perception: No hallucinations   Insight and Judgement: fair  Impulse Control: fair at this time.    
Conscious, cooperative, alert.   No psychomotor agitation/retardation.   Good eye contact.   Speech: regular rate and rhythm,   Mood: "anxiety hasn't been bad" Affect: appropriate, full range, euthymic.   Thought Process: linear, goal directed   Thought Content: Contamination Obsession.  No Death wish, No Suicidal ideation/intent/plan, No homicidal ideation/intent/plan. No delusions   Perception: No hallucinations   Insight and Judgement: fair  Impulse Control: fair at this time.    
Conscious, cooperative, alert.   A bit unkempt but no major hygiene concerns  No psychomotor agitation/retardation.   Fair eye contact.   Speech: regular rate and rhythm, significant pausing before responses.   Mood: "okay"   Affect: anxious, constricted  Thought Process: linear, goal directed   Thought Content: Contamination Obsessions attenuating. No Suicidal ideation/intent/plan, No homicidal ideation/intent/plan. No delusions   Perception: No hallucinations   Insight and Judgement: fair  Impulse Control: fair at this time.    
Conscious, cooperative, alert.   A bit unkempt but no major hygiene concerns  No psychomotor agitation/retardation.   Fair eye contact.   Speech: regular rate and rhythm.  Mood: "okay"   Affect: neutral, constricted  Thought Process: linear, goal directed   Thought Content: Contamination Obsessions ongoing. No Suicidal ideation/intent/plan, No homicidal ideation/intent/plan. No delusions   Perception: No hallucinations   Insight and Judgement: fair  Impulse Control: fair at this time.    
Conscious, cooperative, alert.   Fair hygiene  No psychomotor agitation/retardation.   Fair eye contact.   Speech: regular rate and rhythm.  Mood: "fine"   Affect: neutral, constricted  Thought Process: linear, goal directed   Thought Content: Contamination Obsessions ongoing, improving. No Suicidal ideation/intent/plan, No homicidal ideation/intent/plan. No delusions   Perception: No hallucinations   Insight and Judgement: fair  Impulse Control: fair at this time.    
Conscious, cooperative, alert.   A bit unkempt but no major hygiene concerns  No psychomotor agitation/retardation.   Fair eye contact.   Speech: regular rate and rhythm.  Mood: "okay"   Affect: neutral, constricted  Thought Process: linear, goal directed   Thought Content: Contamination Obsessions ongoing. No Suicidal ideation/intent/plan, No homicidal ideation/intent/plan. No delusions   Perception: No hallucinations   Insight and Judgement: fair  Impulse Control: fair at this time.    
Conscious, cooperative, alert.   Fair hygiene  No psychomotor agitation/retardation.   Fair eye contact.   Speech: regular rate and rhythm.  Mood: "fine"   Affect: neutral, constricted  Thought Process: linear, goal directed   Thought Content: Contamination Obsessions ongoing, improving. No Suicidal ideation/intent/plan, No homicidal ideation/intent/plan. No delusions   Perception: No hallucinations   Insight and Judgement: fair  Impulse Control: fair at this time.    
Conscious, cooperative, alert.   No psychomotor agitation/retardation.   Poor eye contact.   Speech: regular rate and rhythm, significant pausing before responses.   Mood: "okay"   Affect: anxious, constricted  Thought Process: linear, goal directed   Thought Content: Contamination Obsessions attenuating. No Suicidal ideation/intent/plan, No homicidal ideation/intent/plan. No delusions   Perception: No hallucinations   Insight and Judgement: fair  Impulse Control: fair at this time.    
Conscious, cooperative, alert.   A bit unkempt but no major hygiene concerns  No psychomotor agitation/retardation.   Fair eye contact.   Speech: regular rate and rhythm.  Mood: "okay"   Affect: neutral, constricted  Thought Process: linear, goal directed   Thought Content: Contamination Obsessions ongoing. No Suicidal ideation/intent/plan, No homicidal ideation/intent/plan. No delusions   Perception: No hallucinations   Insight and Judgement: fair  Impulse Control: fair at this time.    
Conscious, cooperative, alert.   A bit unkempt but no major hygiene concerns  No psychomotor agitation/retardation.   Fair eye contact.   Speech: regular rate and rhythm.  Mood: "okay"   Affect: neutral, constricted  Thought Process: linear, goal directed   Thought Content: Contamination Obsessions ongoing. No Suicidal ideation/intent/plan, No homicidal ideation/intent/plan. No delusions   Perception: No hallucinations   Insight and Judgement: fair  Impulse Control: fair at this time.    
Conscious, cooperative, alert.   No psychomotor agitation/retardation.   Poor eye contact.   Speech: regular rate and rhythm, significant pausing before responses.   Mood: "okay"   Affect: anxious, constricted  Thought Process: linear, goal directed   Thought Content: Contamination Obsessions attenuating. No Suicidal ideation/intent/plan, No homicidal ideation/intent/plan. No delusions   Perception: No hallucinations   Insight and Judgement: fair  Impulse Control: fair at this time.    
Conscious, cooperative, alert.   Fair hygiene  No psychomotor agitation/retardation.   Fair eye contact.   Speech: regular rate and rhythm.  Mood: "fine"   Affect: neutral, constricted  Thought Process: linear, goal directed   Thought Content: Contamination Obsessions ongoing. No Suicidal ideation/intent/plan, No homicidal ideation/intent/plan. No delusions   Perception: No hallucinations   Insight and Judgement: fair  Impulse Control: fair at this time.    
Conscious, cooperative, alert.   A bit unkempt but no major hygiene concerns  No psychomotor agitation/retardation.   Fair eye contact.   Speech: regular rate and rhythm, significant pausing before responses.   Mood: "okay"   Affect: neutral, constricted  Thought Process: linear, goal directed   Thought Content: Contamination Obsessions ongoing. No Suicidal ideation/intent/plan, No homicidal ideation/intent/plan. No delusions   Perception: No hallucinations   Insight and Judgement: fair  Impulse Control: fair at this time.    
Conscious, cooperative, alert.   No psychomotor agitation/retardation.   Poor eye contact.   Speech: regular rate and rhythm, significant pausing before responses.   Mood: "alright"   Affect: dysphoric, anxious from inability to make a decision regarding medication.  Thought Process: linear, goal directed   Thought Content: Contamination Obsessions attenuating, preoccupied by indecision and thoughts regarding medication. No Suicidal ideation/intent/plan, No homicidal ideation/intent/plan. No delusions   Perception: No hallucinations   Insight and Judgement: fair  Impulse Control: fair at this time.    
Conscious, cooperative, alert.   No psychomotor agitation/retardation.   Poor eye contact.   Speech: regular rate and rhythm, significant pausing before responses.   Mood: "good"   Affect: dysphoric, anxious from inability to make a decision regarding medication.  Thought Process: linear, goal directed   Thought Content: Contamination Obsessions attenuating, preoccupied by indecision and thoughts regarding medication. No Suicidal ideation/intent/plan, No homicidal ideation/intent/plan. No delusions   Perception: No hallucinations   Insight and Judgement: fair  Impulse Control: fair at this time.    
Conscious, cooperative, alert.   Fair hygiene  No psychomotor agitation/retardation.   Fair eye contact.   Speech: regular rate and rhythm.  Mood: "fine"   Affect: neutral, constricted  Thought Process: linear, goal directed   Thought Content: Contamination Obsessions ongoing, improving. No Suicidal ideation/intent/plan, No homicidal ideation/intent/plan. No delusions   Perception: No hallucinations   Insight and Judgement: fair  Impulse Control: fair at this time.    
Conscious, cooperative, alert.   Fair hygiene  No psychomotor agitation/retardation.   Fair eye contact.   Speech: regular rate and rhythm.  Mood: "okay"   Affect: neutral, constricted  Thought Process: linear, goal directed   Thought Content: Contamination Obsessions ongoing. No Suicidal ideation/intent/plan, No homicidal ideation/intent/plan. No delusions   Perception: No hallucinations   Insight and Judgement: fair  Impulse Control: fair at this time.    
Conscious, cooperative, alert.   No psychomotor agitation/retardation.   Poor eye contact.   Speech: regular rate and rhythm, significant pausing before responses.   Mood: "good"   Affect: neutral, constricted  Thought Process: linear, goal directed   Thought Content: Contamination Obsessions attenuating. No Suicidal ideation/intent/plan, No homicidal ideation/intent/plan. No delusions   Perception: No hallucinations   Insight and Judgement: fair  Impulse Control: fair at this time.    
Conscious, cooperative, alert.   A bit unkempt but no major hygiene concerns  No psychomotor agitation/retardation.   Fair eye contact.   Speech: regular rate and rhythm, significant pausing before responses.   Mood: "okay"   Affect: anxious, constricted  Thought Process: linear, goal directed   Thought Content: Contamination Obsessions attenuating. No Suicidal ideation/intent/plan, No homicidal ideation/intent/plan. No delusions   Perception: No hallucinations   Insight and Judgement: fair  Impulse Control: fair at this time.    
Conscious, cooperative, alert.   No psychomotor agitation/retardation.   Good eye contact.   Speech: regular rate and rhythm,   Mood: "good" Affect: appropriate, full range, euthymic, sad appearing when discussing medication.   Thought Process: linear, goal directed   Thought Content: Contamination Obsessions attenuating.  No Death wish, No Suicidal ideation/intent/plan, No homicidal ideation/intent/plan. No delusions   Perception: No hallucinations   Insight and Judgement: fair  Impulse Control: fair at this time.    
Conscious, cooperative, alert.   No psychomotor agitation/retardation.   Poor eye contact.   Speech: regular rate and rhythm, significant pausing before responses.   Mood: "stressed"   Affect: anxious, constricted  Thought Process: linear, goal directed   Thought Content: Contamination Obsessions attenuating. No Suicidal ideation/intent/plan, No homicidal ideation/intent/plan. No delusions   Perception: No hallucinations   Insight and Judgement: fair  Impulse Control: fair at this time.    
Conscious, cooperative, alert.   Fair hygiene  No psychomotor agitation/retardation.   Fair eye contact.   Speech: regular rate and rhythm.  Mood: "fine"   Affect: neutral, constricted  Thought Process: linear, goal directed   Thought Content: Contamination Obsessions ongoing, improving. No Suicidal ideation/intent/plan, No homicidal ideation/intent/plan. No delusions   Perception: No hallucinations   Insight and Judgement: fair  Impulse Control: fair at this time.

## 2025-01-08 NOTE — BH INPATIENT PSYCHIATRY PROGRESS NOTE - NSTXOBSCOMPROGRES_PSY_ALL_CORE
Improving
No Change
Improving
Met - goal discontinued
Improving
Improving
No Change
Improving
No Change
Improving
No Change
Improving
No Change
Improving
Improving
Met - goal discontinued
Improving

## 2025-01-08 NOTE — BH INPATIENT PSYCHIATRY PROGRESS NOTE - NSTXOBSCOMGOAL_PSY_ALL_CORE
Be able to perform most ADLs and self-care despite obsessions and compulsions
Will identify triggers for obsessive thoughts
Will identify triggers for obsessive thoughts
Be able to perform most ADLs and self-care despite obsessions and compulsions
Will identify anxious feelings associated with obsess thoughts
Demonstrate use of a new coping technique to minimize the negative effects of obsessions and compulsions on socialization, activities and mood
Be able to perform most ADLs and self-care despite obsessions and compulsions
Be able to perform most ADLs and self-care despite obsessions and compulsions
Will identify triggers for obsessive thoughts
Will identify anxious feelings associated with obsess thoughts
Be able to perform most ADLs and self-care despite obsessions and compulsions
Will identify triggers for obsessive thoughts
Be able to perform most ADLs and self-care despite obsessions and compulsions
Be able to perform most ADLs and self-care despite obsessions and compulsions
Demonstrate use of a new coping technique to minimize the negative effects of obsessions and compulsions on socialization, activities and mood
Be able to perform most ADLs and self-care despite obsessions and compulsions

## 2025-01-08 NOTE — BH INPATIENT PSYCHIATRY PROGRESS NOTE - NSDCCRITERIA_PSY_ALL_CORE
CGI improvement 2
CGI improvement 2
Results relayed to patient. Patient verbalized understanding, no questions at this time.     Script sent.   
CGI improvement 2

## 2025-01-08 NOTE — BH INPATIENT PSYCHIATRY PROGRESS NOTE - NSTXOBSCOMDATENEW_PSY_ALL_CORE
30-Dec-2024

## 2025-01-08 NOTE — BH INPATIENT PSYCHIATRY PROGRESS NOTE - NSTXDEPRESGOAL_PSY_ALL_CORE
Will identify 2 coping skills that assist in improving mood
Attend and participate in at least 2 groups daily despite low mood/energy
Report using a coping skill to overcome sadness and worry in order to socialize with peers daily
Report using a coping skill to overcome sadness and worry in order to socialize with peers daily
Will identify 2 coping skills that assist in improving mood
Attend and participate in at least 2 groups daily despite low mood/energy
Report using a coping skill to overcome sadness and worry in order to socialize with peers daily
Attend and participate in at least 2 groups daily despite low mood/energy
Will identify 2 coping skills that assist in improving mood
Attend and participate in at least 2 groups daily despite low mood/energy
Attend and participate in at least 2 groups daily despite low mood/energy
Report using a coping skill to overcome sadness and worry in order to socialize with peers daily
Attend and participate in at least 2 groups daily despite low mood/energy
Will identify 2 coping skills that assist in improving mood
Attend and participate in at least 2 groups daily despite low mood/energy
Will identify 2 coping skills that assist in improving mood
Attend and participate in at least 2 groups daily despite low mood/energy
Attend and participate in at least 2 groups daily despite low mood/energy
Will identify 2 coping skills that assist in improving mood
Report using a coping skill to overcome sadness and worry in order to socialize with peers daily
Will identify 2 coping skills that assist in improving mood

## 2025-01-08 NOTE — BH INPATIENT PSYCHIATRY PROGRESS NOTE - NSTXMEDICGOAL_PSY_ALL_CORE
Take all medications as prescribed
Be able to describe the benefit of medication/treatment
Take all medications as prescribed
Be able to describe the benefit of medication/treatment
Be able to describe the benefit of medication/treatment
Take all medications as prescribed
Be able to describe the benefit of medication/treatment
Take all medications as prescribed
Be able to describe the benefit of medication/treatment
Take all medications as prescribed
Be able to describe the benefit of medication/treatment
Take all medications as prescribed
Be able to describe the benefit of medication/treatment
Take all medications as prescribed

## 2025-01-08 NOTE — BH INPATIENT PSYCHIATRY PROGRESS NOTE - NSTXCOPEPROGRES_PSY_ALL_CORE
Improving
No Change
Improving
No Change
Improving
Improving
No Change
No Change
Improving
No Change
Improving
No Change
Improving
Met - goal discontinued
Improving

## 2025-01-08 NOTE — BH INPATIENT PSYCHIATRY PROGRESS NOTE - NSTXDEPRESDATETRGT_PSY_ALL_CORE
02-Jan-2025
18-Dec-2024
07-Jan-2025
07-Jan-2025
25-Dec-2024
11-Dec-2024
11-Dec-2024
25-Dec-2024
25-Dec-2024
11-Dec-2024
07-Jan-2025
11-Dec-2024
18-Dec-2024
18-Dec-2024
25-Dec-2024
11-Dec-2024
25-Dec-2024
18-Dec-2024
02-Jan-2025
11-Dec-2024
02-Jan-2025
02-Jan-2025
18-Dec-2024
14-Jan-2025
07-Jan-2025

## 2025-01-08 NOTE — BH INPATIENT PSYCHIATRY PROGRESS NOTE - NSTXDCOPLKPROGRES_PSY_ALL_CORE
No Change
Improving
No Change
Improving
No Change

## 2025-01-08 NOTE — BH INPATIENT PSYCHIATRY PROGRESS NOTE - NSTXDEPRESPROGRES_PSY_ALL_CORE
Improving
No Change
Improving
No Change
Improving
No Change
Improving
No Change
Improving

## 2025-01-08 NOTE — BH INPATIENT PSYCHIATRY PROGRESS NOTE - NSBHASSESSSUMMFT_PSY_ALL_CORE
29M with history of OCD with multiple med trials and ERP in the past, not functioning with very poor hygiene and bed ridden due to obsessions and accompanying avoidance behaviors regarding contamination with invisible contaminants like mold spores.  Reports he seeks voluntary admission due to family intervention.  States he has been frequently noncompliant with past treatment because of need to feel he is improving on his own terms.  Patient appears euthymic and is talkative.  Suspect characterologic component to treatment failures thus far.    Tolerating sertraline. Remains with severe obsessions but improving.  Discharge tomorrow.    Admitted 9.13 status  Routine checks  Discussion of treatment options ongoing  Sertraline to 350mg daily  Ativan PRN for agitation  ongoing psychotherapy in hospital

## 2025-01-08 NOTE — BH INPATIENT PSYCHIATRY PROGRESS NOTE - NSICDXBHPRIMARYDX_PSY_ALL_CORE
Obsessive compulsive disorder   F42.9  

## 2025-01-08 NOTE — BH INPATIENT PSYCHIATRY PROGRESS NOTE - NSTXDEPRESDATEEST_PSY_ALL_CORE
26-Dec-2024
04-Dec-2024
26-Dec-2024
18-Dec-2024
31-Dec-2024
04-Dec-2024
11-Dec-2024
11-Dec-2024
18-Dec-2024
11-Dec-2024
04-Dec-2024
18-Dec-2024
31-Dec-2024
02-Dec-2024
11-Dec-2024
04-Dec-2024
26-Dec-2024
04-Dec-2024
04-Dec-2024
11-Dec-2024
31-Dec-2024
18-Dec-2024
31-Dec-2024
18-Dec-2024
26-Dec-2024

## 2025-01-08 NOTE — BH INPATIENT PSYCHIATRY PROGRESS NOTE - NSTXDCOPLKDATETRGT_PSY_ALL_CORE
07-Jan-2025
07-Jan-2025
11-Dec-2024
18-Dec-2024
31-Dec-2024
26-Dec-2024
09-Jan-2025
31-Dec-2024
18-Dec-2024
18-Dec-2024
11-Dec-2024
18-Dec-2024
26-Dec-2024
07-Jan-2025
11-Dec-2024
18-Dec-2024
09-Jan-2025
11-Dec-2024
11-Dec-2024
31-Dec-2024
26-Dec-2024
31-Dec-2024
18-Dec-2024
11-Dec-2024
07-Jan-2025

## 2025-01-08 NOTE — BH INPATIENT PSYCHIATRY PROGRESS NOTE - NSBHATTESTTYPEVISIT_PSY_A_CORE
Attending Only
[facility] without on-site Attending supervision
Attending Only
[facility] without on-site Attending supervision
Attending Only
[facility] without on-site Attending supervision
Attending Only
Attending with Resident/Fellow/Student

## 2025-01-08 NOTE — BH INPATIENT PSYCHIATRY PROGRESS NOTE - NSTXCOPEDATEEST_PSY_ALL_CORE
18-Dec-2024
02-Dec-2024
11-Dec-2024
11-Dec-2024
04-Dec-2024
04-Dec-2024
18-Dec-2024
18-Dec-2024
30-Dec-2024
04-Dec-2024
11-Dec-2024
18-Dec-2024
30-Dec-2024
11-Dec-2024
04-Dec-2024
11-Dec-2024
18-Dec-2024
04-Dec-2024
04-Dec-2024
31-Dec-2024
31-Dec-2024

## 2025-01-08 NOTE — BH INPATIENT PSYCHIATRY PROGRESS NOTE - NSTXMEDICPROGRES_PSY_ALL_CORE
Improving
Improving
No Change
Improving
No Change
Improving
Improving
No Change
Improving
No Change
Improving
No Change
Improving
Improving
No Change
Improving
Improving

## 2025-01-08 NOTE — BH INPATIENT PSYCHIATRY PROGRESS NOTE - NSTXMEDICDATEEST_PSY_ALL_CORE
02-Dec-2024
26-Dec-2024
11-Dec-2024
26-Dec-2024
18-Dec-2024
31-Dec-2024
04-Dec-2024
26-Dec-2024
18-Dec-2024
04-Dec-2024
11-Dec-2024
04-Dec-2024
18-Dec-2024
04-Dec-2024
11-Dec-2024
11-Dec-2024
18-Dec-2024
04-Dec-2024
04-Dec-2024
31-Dec-2024
11-Dec-2024
18-Dec-2024
26-Dec-2024

## 2025-01-08 NOTE — BH INPATIENT PSYCHIATRY PROGRESS NOTE - NSBHCONSDANGERSELF_PSY_A_CORE
unable to care for self

## 2025-01-08 NOTE — BH INPATIENT PSYCHIATRY PROGRESS NOTE - NSTXDCOPLKDATEEST_PSY_ALL_CORE
03-Dec-2024
24-Dec-2024
31-Dec-2024
04-Dec-2024
04-Dec-2024
03-Dec-2024
24-Dec-2024
31-Dec-2024
03-Dec-2024
24-Dec-2024
04-Dec-2024
19-Dec-2024
31-Dec-2024
03-Dec-2024
04-Dec-2024
19-Dec-2024
19-Dec-2024
04-Dec-2024
24-Dec-2024
04-Dec-2024

## 2025-01-08 NOTE — BH INPATIENT PSYCHIATRY PROGRESS NOTE - NSBHCHARTREVIEWVS_PSY_A_CORE FT
Vital Signs Last 24 Hrs  T(C): 36.4 (12-20-24 @ 08:37), Max: 36.4 (12-20-24 @ 08:37)  T(F): 97.6 (12-20-24 @ 08:37), Max: 97.6 (12-20-24 @ 08:37)  HR: --  BP: --  BP(mean): --  RR: --  SpO2: --    Orthostatic VS  12-20-24 @ 08:37  Lying BP: --/-- HR: --  Sitting BP: 118/77 HR: 87  Standing BP: 106/72 HR: 103  Site: --  Mode: --  Orthostatic VS  12-19-24 @ 07:56  Lying BP: --/-- HR: --  Sitting BP: 109/72 HR: 86  Standing BP: 108/77 HR: 97  Site: --  Mode: --  
Vital Signs Last 24 Hrs  T(C): 36.3 (01-06-25 @ 07:29), Max: 36.3 (01-06-25 @ 07:29)  T(F): 97.3 (01-06-25 @ 07:29), Max: 97.3 (01-06-25 @ 07:29)  HR: --  BP: --  BP(mean): --  RR: --  SpO2: --    Orthostatic VS  01-06-25 @ 07:29  Lying BP: --/-- HR: --  Sitting BP: 106/60 HR: 87  Standing BP: --/60 HR: 87  Site: --  Mode: --  
Vital Signs Last 24 Hrs  T(C): 36.4 (01-03-25 @ 08:09), Max: 36.4 (01-03-25 @ 08:09)  T(F): 97.6 (01-03-25 @ 08:09), Max: 97.6 (01-03-25 @ 08:09)  HR: --  BP: --  BP(mean): --  RR: --  SpO2: --    Orthostatic VS  01-03-25 @ 08:09  Lying BP: --/-- HR: --  Sitting BP: 104/76 HR: 77  Standing BP: 107/81 HR: 96  Site: --  Mode: --  Orthostatic VS  01-02-25 @ 08:36  Lying BP: --/-- HR: --  Sitting BP: 94/84 HR: 93  Standing BP: 97/71 HR: 114  Site: --  Mode: --  
Vital Signs Last 24 Hrs  T(C): 36.3 (12-11-24 @ 06:39), Max: 36.3 (12-11-24 @ 06:39)  T(F): 97.3 (12-11-24 @ 06:39), Max: 97.3 (12-11-24 @ 06:39)  HR: --  BP: --  BP(mean): --  RR: --  SpO2: --    Orthostatic VS  12-11-24 @ 06:39  Lying BP: --/-- HR: --  Sitting BP: 121/74 HR: 89  Standing BP: 117/75 HR: 97  Site: --  Mode: --  Orthostatic VS  12-10-24 @ 07:55  Lying BP: --/-- HR: --  Sitting BP: 110/66 HR: 78  Standing BP: 118/73 HR: 90  Site: --  Mode: --  
Vital Signs Last 24 Hrs  T(C): 36.3 (01-08-25 @ 07:46), Max: 36.3 (01-08-25 @ 07:46)  T(F): 97.3 (01-08-25 @ 07:46), Max: 97.3 (01-08-25 @ 07:46)  HR: --  BP: --  BP(mean): --  RR: --  SpO2: --    Orthostatic VS  01-08-25 @ 07:46  Lying BP: --/-- HR: --  Sitting BP: 108/86 HR: 81  Standing BP: 113/81 HR: 94  Site: --  Mode: --  
Vital Signs Last 24 Hrs  T(C): 36.8 (12-26-24 @ 07:45), Max: 36.8 (12-26-24 @ 07:45)  T(F): 98.2 (12-26-24 @ 07:45), Max: 98.2 (12-26-24 @ 07:45)  HR: --  BP: --  BP(mean): --  RR: --  SpO2: --    Orthostatic VS  12-26-24 @ 07:45  Lying BP: --/-- HR: --  Sitting BP: 100/75 HR: 83  Standing BP: 105/74 HR: 92  Site: --  Mode: --  
Vital Signs Last 24 Hrs  T(C): 36.2 (12-30-24 @ 08:19), Max: 36.2 (12-30-24 @ 08:19)  T(F): 97.1 (12-30-24 @ 08:19), Max: 97.1 (12-30-24 @ 08:19)  HR: --  BP: --  BP(mean): --  RR: --  SpO2: --    Orthostatic VS  12-30-24 @ 08:19  Lying BP: --/-- HR: --  Sitting BP: 106/73 HR: 66  Standing BP: 109/67 HR: 69  Site: --  Mode: --  Orthostatic VS  12-29-24 @ 08:46  Lying BP: --/-- HR: --  Sitting BP: 106/73 HR: 77  Standing BP: 97/71 HR: 95  Site: --  Mode: --  
Vital Signs Last 24 Hrs  T(C): 36.6 (12-07-24 @ 07:55), Max: 36.6 (12-07-24 @ 07:55)  T(F): 97.8 (12-07-24 @ 07:55), Max: 97.8 (12-07-24 @ 07:55)  HR: --  BP: --  BP(mean): --  RR: 18 (12-07-24 @ 07:55) (18 - 18)  SpO2: --    Orthostatic VS  12-07-24 @ 07:55  Lying BP: --/-- HR: --  Sitting BP: 109/72 HR: 100  Standing BP: 105/76 HR: 104  Site: upper right arm  Mode: electronic  Orthostatic VS  12-06-24 @ 08:18  Lying BP: --/-- HR: --  Sitting BP: 103/68 HR: 82  Standing BP: 112/83 HR: 97  Site: --  Mode: --  
Vital Signs Last 24 Hrs  T(C): 36.4 (12-19-24 @ 07:56), Max: 36.4 (12-19-24 @ 07:56)  T(F): 97.6 (12-19-24 @ 07:56), Max: 97.6 (12-19-24 @ 07:56)  HR: --  BP: --  BP(mean): --  RR: --  SpO2: --    Orthostatic VS  12-19-24 @ 07:56  Lying BP: --/-- HR: --  Sitting BP: 109/72 HR: 86  Standing BP: 108/77 HR: 97  Site: --  Mode: --  Orthostatic VS  12-18-24 @ 07:44  Lying BP: --/-- HR: --  Sitting BP: 109/73 HR: 81  Standing BP: 107/72 HR: 96  Site: --  Mode: --  
Vital Signs Last 24 Hrs  T(C): 36.6 (12-23-24 @ 07:54), Max: 36.6 (12-23-24 @ 07:54)  T(F): 97.9 (12-23-24 @ 07:54), Max: 97.9 (12-23-24 @ 07:54)  HR: --  BP: --  BP(mean): --  RR: --  SpO2: --    Orthostatic VS  12-23-24 @ 07:54  Lying BP: --/-- HR: --  Sitting BP: 109/77 HR: 87  Standing BP: 102/68 HR: 99  Site: --  Mode: --  Orthostatic VS  12-22-24 @ 07:56  Lying BP: --/-- HR: --  Sitting BP: 119/80 HR: 88  Standing BP: 121/58 HR: 99  Site: --  Mode: --  
Vital Signs Last 24 Hrs  T(C): 36.6 (12-17-24 @ 08:38), Max: 36.6 (12-17-24 @ 08:38)  T(F): 97.8 (12-17-24 @ 08:38), Max: 97.8 (12-17-24 @ 08:38)  HR: --  BP: --  BP(mean): --  RR: --  SpO2: --    Orthostatic VS  12-17-24 @ 08:38  Lying BP: --/-- HR: --  Sitting BP: 103/68 HR: 78  Standing BP: 100/73 HR: 86  Site: --  Mode: --  
Vital Signs Last 24 Hrs  T(C): --  T(F): --  HR: --  BP: --  BP(mean): --  RR: --  SpO2: --    Orthostatic VS  12-15-24 @ 06:40  Lying BP: --/-- HR: --  Sitting BP: 113/78 HR: 78  Standing BP: 117/85 HR: 90  Site: --  Mode: --  
Vital Signs Last 24 Hrs  T(C): 36.4 (01-02-25 @ 08:36), Max: 36.4 (01-02-25 @ 08:36)  T(F): 97.5 (01-02-25 @ 08:36), Max: 97.5 (01-02-25 @ 08:36)  HR: --  BP: --  BP(mean): --  RR: --  SpO2: --    Orthostatic VS  01-02-25 @ 08:36  Lying BP: --/-- HR: --  Sitting BP: 94/84 HR: 93  Standing BP: 97/71 HR: 114  Site: --  Mode: --  Orthostatic VS  01-01-25 @ 08:38  Lying BP: --/-- HR: --  Sitting BP: 113/71 HR: 88  Standing BP: 104/73 HR: 89  Site: --  Mode: --  
Vital Signs Last 24 Hrs  T(C): 36.3 (12-12-24 @ 07:57), Max: 36.3 (12-12-24 @ 07:57)  T(F): 97.4 (12-12-24 @ 07:57), Max: 97.4 (12-12-24 @ 07:57)  HR: --  BP: --  BP(mean): --  RR: --  SpO2: --    Orthostatic VS  12-12-24 @ 07:57  Lying BP: --/-- HR: --  Sitting BP: 110/87 HR: 95  Standing BP: 114/80 HR: 105  Site: --  Mode: --  Orthostatic VS  12-11-24 @ 06:39  Lying BP: --/-- HR: --  Sitting BP: 121/74 HR: 89  Standing BP: 117/75 HR: 97  Site: --  Mode: --  
Vital Signs Last 24 Hrs  T(C): 35.7 (12-06-24 @ 08:18), Max: 35.7 (12-06-24 @ 08:18)  T(F): 96.3 (12-06-24 @ 08:18), Max: 96.3 (12-06-24 @ 08:18)  HR: --  BP: --  BP(mean): --  RR: --  SpO2: --    Orthostatic VS  12-06-24 @ 08:18  Lying BP: --/-- HR: --  Sitting BP: 103/68 HR: 82  Standing BP: 112/83 HR: 97  Site: --  Mode: --  Orthostatic VS  12-05-24 @ 08:30  Lying BP: --/-- HR: --  Sitting BP: 94/64 HR: 80  Standing BP: 96/63 HR: 97  Site: --  Mode: --  
Vital Signs Last 24 Hrs  T(C): --  T(F): --  HR: --  BP: --  BP(mean): --  RR: --  SpO2: --    Orthostatic VS  12-30-24 @ 08:19  Lying BP: --/-- HR: --  Sitting BP: 106/73 HR: 66  Standing BP: 109/67 HR: 69  Site: --  Mode: --  
Vital Signs Last 24 Hrs  T(C): 36.2 (12-04-24 @ 08:45), Max: 36.2 (12-04-24 @ 08:45)  T(F): 97.1 (12-04-24 @ 08:45), Max: 97.1 (12-04-24 @ 08:45)  HR: --  BP: --  BP(mean): --  RR: --  SpO2: --    Orthostatic VS  12-04-24 @ 08:45  Lying BP: --/-- HR: --  Sitting BP: 117/65 HR: 90  Standing BP: 120/69 HR: 93  Site: --  Mode: --  Orthostatic VS  12-03-24 @ 00:54  Lying BP: --/-- HR: --  Sitting BP: 111/69 HR: 107  Standing BP: 106/67 HR: 102  Site: --  Mode: --  
Vital Signs Last 24 Hrs  T(C): 36.9 (12-05-24 @ 08:30), Max: 36.9 (12-05-24 @ 08:30)  T(F): 98.4 (12-05-24 @ 08:30), Max: 98.4 (12-05-24 @ 08:30)  HR: --  BP: --  BP(mean): --  RR: --  SpO2: --    Orthostatic VS  12-05-24 @ 08:30  Lying BP: --/-- HR: --  Sitting BP: 94/64 HR: 80  Standing BP: 96/63 HR: 97  Site: --  Mode: --  Orthostatic VS  12-04-24 @ 08:45  Lying BP: --/-- HR: --  Sitting BP: 117/65 HR: 90  Standing BP: 120/69 HR: 93  Site: --  Mode: --  
Vital Signs Last 24 Hrs  T(C): 36.4 (12-18-24 @ 07:44), Max: 36.4 (12-18-24 @ 07:44)  T(F): 97.6 (12-18-24 @ 07:44), Max: 97.6 (12-18-24 @ 07:44)  HR: --  BP: --  BP(mean): --  RR: --  SpO2: --    Orthostatic VS  12-18-24 @ 07:44  Lying BP: --/-- HR: --  Sitting BP: 109/73 HR: 81  Standing BP: 107/72 HR: 96  Site: --  Mode: --  Orthostatic VS  12-17-24 @ 08:38  Lying BP: --/-- HR: --  Sitting BP: 103/68 HR: 78  Standing BP: 100/73 HR: 86  Site: --  Mode: --  
Vital Signs Last 24 Hrs  T(C): 36.3 (12-13-24 @ 07:51), Max: 36.3 (12-13-24 @ 07:51)  T(F): 97.4 (12-13-24 @ 07:51), Max: 97.4 (12-13-24 @ 07:51)  HR: --  BP: --  BP(mean): --  RR: --  SpO2: --    Orthostatic VS  12-13-24 @ 07:51  Lying BP: --/-- HR: --  Sitting BP: 120/79 HR: 74  Standing BP: 127/82 HR: 85  Site: --  Mode: --  Orthostatic VS  12-12-24 @ 07:57  Lying BP: --/-- HR: --  Sitting BP: 110/87 HR: 95  Standing BP: 114/80 HR: 105  Site: --  Mode: --  
Vital Signs Last 24 Hrs  T(C): 36.4 (12-10-24 @ 07:55), Max: 36.4 (12-10-24 @ 07:55)  T(F): 97.5 (12-10-24 @ 07:55), Max: 97.5 (12-10-24 @ 07:55)  HR: --  BP: --  BP(mean): --  RR: --  SpO2: --    Orthostatic VS  12-10-24 @ 07:55  Lying BP: --/-- HR: --  Sitting BP: 110/66 HR: 78  Standing BP: 118/73 HR: 90  Site: --  Mode: --  Orthostatic VS  12-09-24 @ 08:23  Lying BP: --/-- HR: --  Sitting BP: 114/61 HR: 74  Standing BP: 120/64 HR: 77  Site: --  Mode: --  
Vital Signs Last 24 Hrs  T(C): 36.2 (12-09-24 @ 08:23), Max: 36.2 (12-09-24 @ 08:23)  T(F): 97.2 (12-09-24 @ 08:23), Max: 97.2 (12-09-24 @ 08:23)  HR: --  BP: --  BP(mean): --  RR: --  SpO2: --    Orthostatic VS  12-09-24 @ 08:23  Lying BP: --/-- HR: --  Sitting BP: 114/61 HR: 74  Standing BP: 120/64 HR: 77  Site: --  Mode: --  Orthostatic VS  12-08-24 @ 08:41  Lying BP: --/-- HR: --  Sitting BP: 99/82 HR: 92  Standing BP: 108/70 HR: 104  Site: --  Mode: --  
Vital Signs Last 24 Hrs  T(C): 36.6 (12-27-24 @ 08:13), Max: 36.6 (12-27-24 @ 08:13)  T(F): 97.9 (12-27-24 @ 08:13), Max: 97.9 (12-27-24 @ 08:13)  HR: --  BP: --  BP(mean): --  RR: --  SpO2: --    Orthostatic VS  12-27-24 @ 08:13  Lying BP: --/-- HR: --  Sitting BP: 105/72 HR: 73  Standing BP: 107/74 HR: 73  Site: --  Mode: --  Orthostatic VS  12-26-24 @ 07:45  Lying BP: --/-- HR: --  Sitting BP: 100/75 HR: 83  Standing BP: 105/74 HR: 92  Site: --  Mode: --  
Vital Signs Last 24 Hrs  T(C): --  T(F): --  HR: --  BP: --  BP(mean): --  RR: --  SpO2: --    Orthostatic VS  01-06-25 @ 07:29  Lying BP: --/-- HR: --  Sitting BP: 106/60 HR: 87  Standing BP: --/60 HR: 87  Site: --  Mode: --  
Vital Signs Last 24 Hrs  T(C): 36.1 (12-24-24 @ 06:33), Max: 36.1 (12-24-24 @ 06:33)  T(F): 97 (12-24-24 @ 06:33), Max: 97 (12-24-24 @ 06:33)  HR: --  BP: --  BP(mean): --  RR: --  SpO2: --    Orthostatic VS  12-24-24 @ 06:33  Lying BP: --/-- HR: --  Sitting BP: 99/70 HR: 85  Standing BP: 101/72 HR: 87  Site: --  Mode: --  Orthostatic VS  12-23-24 @ 07:54  Lying BP: --/-- HR: --  Sitting BP: 109/77 HR: 87  Standing BP: 102/68 HR: 99  Site: --  Mode: --

## 2025-01-08 NOTE — BH INPATIENT PSYCHIATRY PROGRESS NOTE - NSTXPROBOBSCOM_PSY_ALL_CORE
OBSESSIONS/COMPULSIONS

## 2025-01-08 NOTE — BH INPATIENT PSYCHIATRY PROGRESS NOTE - NSTXCOPEDATETRGT_PSY_ALL_CORE
07-Jan-2025
18-Dec-2024
11-Dec-2024
25-Dec-2024
11-Dec-2024
11-Dec-2024
25-Dec-2024
25-Dec-2024
18-Dec-2024
25-Dec-2024
25-Dec-2024
18-Dec-2024
25-Dec-2024
13-Jan-2025
09-Jan-2025
25-Dec-2024
11-Dec-2024
07-Jan-2025
11-Dec-2024
18-Dec-2024
25-Dec-2024
11-Dec-2024
13-Jan-2025
18-Dec-2024
25-Dec-2024

## 2025-01-08 NOTE — BH INPATIENT PSYCHIATRY PROGRESS NOTE - NSBHATTESTBILLING_PSY_A_CORE
64740-Nclofuakwz OBS or IP - low complexity OR 25-34 mins
Artificial Intelligence Monitoring in Nursing (AIMS Nursing) Study    Principle Investigator - Dr. Alvin Hernandez  Research Coordinator - JONG Rodriguez     Patient Name - William A Franke  Date - 12/23/2024 3:29 PM  Location - [facility]    William A Franke was approached by JONG Rodriguez to talk about participating in the AIMS Nursing Study. The consent form was signed by the patient and they were given a copy for their records. Study protocol was followed and patient was given study contact information.     JONG Rodriguez     
[id number]-Mfvhjzwxak OBS or IP - low complexity OR 25-34 mins
53565-Ofxhavaqed OBS or IP - moderate complexity OR 35-49 mins
[id number]-Kzkfivmafl OBS or IP - low complexity OR 25-34 mins
[id number]-Qpjxefawgf OBS or IP - low complexity OR 25-34 mins
05840-Lowxfwnnng OBS or IP - low complexity OR 25-34 mins
50813-Veqrqfkozj OBS or IP - low complexity OR 25-34 mins
55363-Oyxfgnnfxg OBS or IP - low complexity OR 25-34 mins
06692-Oanhetbtxs OBS or IP - low complexity OR 25-34 mins
23766-Brijitycmf OBS or IP - low complexity OR 25-34 mins
43111-Tslckxjvni OBS or IP - low complexity OR 25-34 mins
50914-Dpolrvdlsg OBS or IP - low complexity OR 25-34 mins
08589-Czquezmbxg OBS or IP - moderate complexity OR 35-49 mins
85877-Wplxxrkulp OBS or IP - low complexity OR 25-34 mins
31796-Mdhfmqtdzi OBS or IP - moderate complexity OR 35-49 mins
40905-Qbiboirojx OBS or IP - moderate complexity OR 35-49 mins
11901-Ussdybsgxl OBS or IP - low complexity OR 25-34 mins
75256-Absuntclyn OBS or IP - low complexity OR 25-34 mins
95740-Vmalyufmoe OBS or IP - low complexity OR 25-34 mins
53968-Gkkzethuob OBS or IP - low complexity OR 25-34 mins
75750-Ymuwvgflpb OBS or IP - low complexity OR 25-34 mins
66868-Ohorzzicxt OBS or IP - low complexity OR 25-34 mins
97036-Hucukeslye OBS or IP - low complexity OR 25-34 mins
78067-Mdnopbceos OBS or IP - low complexity OR 25-34 mins
52417-Cholqcoekh OBS or IP - moderate complexity OR 35-49 mins

## 2025-01-08 NOTE — BH INPATIENT PSYCHIATRY PROGRESS NOTE - NSTXPROBDCOPLK_PSY_ALL_CORE
DISCHARGE ISSUE - LACK OF APPROPRIATE OUTPATIENT SERVICES

## 2025-01-08 NOTE — BH INPATIENT PSYCHIATRY PROGRESS NOTE - NSBHFUPINTERVALHXFT_PSY_A_CORE
Chart reviewed. Case discussed with treatment team. Patient seen and examined for f/u of OCD. No acute overnight events, no PRNs required/requested. Compliant with standing medications, denied side effects. Patient continues to be social on the unit and to engage in activities. Feels obsessions have lessened.  Preparing himself for what he will face in parents' home in [place], wondering about physical condition of things.  only his father will be home when he arrives.

## 2025-01-08 NOTE — BH INPATIENT PSYCHIATRY PROGRESS NOTE - NSTXOBSCOMDATETRGT_PSY_ALL_CORE
18-Dec-2024
07-Jan-2025
18-Dec-2024
18-Dec-2024
25-Dec-2024
14-Jan-2025
06-Jan-2025
06-Jan-2025
23-Dec-2024
18-Dec-2024
11-Dec-2024
25-Dec-2024
11-Dec-2024
17-Dec-2024
23-Dec-2024
24-Dec-2024
02-Jan-2025
02-Jan-2025
11-Dec-2024
11-Dec-2024
25-Dec-2024
07-Jan-2025
11-Dec-2024

## 2025-01-08 NOTE — BH INPATIENT PSYCHIATRY PROGRESS NOTE - NSBHMETABOLIC_PSY_ALL_CORE_FT
BMI: BMI (kg/m2): 20.5 (12-03-24 @ 00:54)  HbA1c:   Glucose:   BP: 112/71 (12-02-24 @ 20:52) (112/71 - 125/81)Vital Signs Last 24 Hrs  T(C): 36.2 (12-04-24 @ 08:45), Max: 36.2 (12-04-24 @ 08:45)  T(F): 97.1 (12-04-24 @ 08:45), Max: 97.1 (12-04-24 @ 08:45)  HR: --  BP: --  BP(mean): --  RR: --  SpO2: --    Orthostatic VS  12-04-24 @ 08:45  Lying BP: --/-- HR: --  Sitting BP: 117/65 HR: 90  Standing BP: 120/69 HR: 93  Site: --  Mode: --  Orthostatic VS  12-03-24 @ 00:54  Lying BP: --/-- HR: --  Sitting BP: 111/69 HR: 107  Standing BP: 106/67 HR: 102  Site: --  Mode: --    Lipid Panel: 
BMI: BMI (kg/m2): 49.9 (12-07-24 @ 10:02)  HbA1c:   Glucose:   BP: --Vital Signs Last 24 Hrs  T(C): 36.3 (12-12-24 @ 07:57), Max: 36.3 (12-12-24 @ 07:57)  T(F): 97.4 (12-12-24 @ 07:57), Max: 97.4 (12-12-24 @ 07:57)  HR: --  BP: --  BP(mean): --  RR: --  SpO2: --    Orthostatic VS  12-12-24 @ 07:57  Lying BP: --/-- HR: --  Sitting BP: 110/87 HR: 95  Standing BP: 114/80 HR: 105  Site: --  Mode: --  Orthostatic VS  12-11-24 @ 06:39  Lying BP: --/-- HR: --  Sitting BP: 121/74 HR: 89  Standing BP: 117/75 HR: 97  Site: --  Mode: --    Lipid Panel: 
BMI: BMI (kg/m2): 49.9 (12-07-24 @ 10:02)  HbA1c:   Glucose:   BP: --Vital Signs Last 24 Hrs  T(C): 36.3 (12-11-24 @ 06:39), Max: 36.3 (12-11-24 @ 06:39)  T(F): 97.3 (12-11-24 @ 06:39), Max: 97.3 (12-11-24 @ 06:39)  HR: --  BP: --  BP(mean): --  RR: --  SpO2: --    Orthostatic VS  12-11-24 @ 06:39  Lying BP: --/-- HR: --  Sitting BP: 121/74 HR: 89  Standing BP: 117/75 HR: 97  Site: --  Mode: --  Orthostatic VS  12-10-24 @ 07:55  Lying BP: --/-- HR: --  Sitting BP: 110/66 HR: 78  Standing BP: 118/73 HR: 90  Site: --  Mode: --    Lipid Panel: 
BMI: BMI (kg/m2): 49.9 (12-07-24 @ 10:02)  HbA1c:   Glucose:   BP: --Vital Signs Last 24 Hrs  T(C): 36.4 (01-02-25 @ 08:36), Max: 36.4 (01-02-25 @ 08:36)  T(F): 97.5 (01-02-25 @ 08:36), Max: 97.5 (01-02-25 @ 08:36)  HR: --  BP: --  BP(mean): --  RR: --  SpO2: --    Orthostatic VS  01-02-25 @ 08:36  Lying BP: --/-- HR: --  Sitting BP: 94/84 HR: 93  Standing BP: 97/71 HR: 114  Site: --  Mode: --  Orthostatic VS  01-01-25 @ 08:38  Lying BP: --/-- HR: --  Sitting BP: 113/71 HR: 88  Standing BP: 104/73 HR: 89  Site: --  Mode: --    Lipid Panel: 
BMI: BMI (kg/m2): 49.9 (12-07-24 @ 10:02)  HbA1c:   Glucose:   BP: --Vital Signs Last 24 Hrs  T(C): 36.1 (12-24-24 @ 06:33), Max: 36.1 (12-24-24 @ 06:33)  T(F): 97 (12-24-24 @ 06:33), Max: 97 (12-24-24 @ 06:33)  HR: --  BP: --  BP(mean): --  RR: --  SpO2: --    Orthostatic VS  12-24-24 @ 06:33  Lying BP: --/-- HR: --  Sitting BP: 99/70 HR: 85  Standing BP: 101/72 HR: 87  Site: --  Mode: --  Orthostatic VS  12-23-24 @ 07:54  Lying BP: --/-- HR: --  Sitting BP: 109/77 HR: 87  Standing BP: 102/68 HR: 99  Site: --  Mode: --    Lipid Panel: 
BMI: BMI (kg/m2): 22.3 (01-05-25 @ 09:07)  HbA1c:   Glucose:   BP: --Vital Signs Last 24 Hrs  T(C): 36.3 (01-08-25 @ 07:46), Max: 36.3 (01-08-25 @ 07:46)  T(F): 97.3 (01-08-25 @ 07:46), Max: 97.3 (01-08-25 @ 07:46)  HR: --  BP: --  BP(mean): --  RR: --  SpO2: --    Orthostatic VS  01-08-25 @ 07:46  Lying BP: --/-- HR: --  Sitting BP: 108/86 HR: 81  Standing BP: 113/81 HR: 94  Site: --  Mode: --    Lipid Panel: 
BMI: BMI (kg/m2): 22.3 (01-05-25 @ 09:07)  HbA1c:   Glucose:   BP: --Vital Signs Last 24 Hrs  T(C): 36.3 (01-06-25 @ 07:29), Max: 36.3 (01-06-25 @ 07:29)  T(F): 97.3 (01-06-25 @ 07:29), Max: 97.3 (01-06-25 @ 07:29)  HR: --  BP: --  BP(mean): --  RR: --  SpO2: --    Orthostatic VS  01-06-25 @ 07:29  Lying BP: --/-- HR: --  Sitting BP: 106/60 HR: 87  Standing BP: --/60 HR: 87  Site: --  Mode: --    Lipid Panel: 
BMI: BMI (kg/m2): 49.9 (12-07-24 @ 10:02)  HbA1c:   Glucose:   BP: --Vital Signs Last 24 Hrs  T(C): 36.6 (12-07-24 @ 07:55), Max: 36.6 (12-07-24 @ 07:55)  T(F): 97.8 (12-07-24 @ 07:55), Max: 97.8 (12-07-24 @ 07:55)  HR: --  BP: --  BP(mean): --  RR: 18 (12-07-24 @ 07:55) (18 - 18)  SpO2: --    Orthostatic VS  12-07-24 @ 07:55  Lying BP: --/-- HR: --  Sitting BP: 109/72 HR: 100  Standing BP: 105/76 HR: 104  Site: upper right arm  Mode: electronic  Orthostatic VS  12-06-24 @ 08:18  Lying BP: --/-- HR: --  Sitting BP: 103/68 HR: 82  Standing BP: 112/83 HR: 97  Site: --  Mode: --    Lipid Panel: 
BMI: BMI (kg/m2): 49.9 (12-07-24 @ 10:02)  HbA1c:   Glucose:   BP: --Vital Signs Last 24 Hrs  T(C): 36.8 (12-26-24 @ 07:45), Max: 36.8 (12-26-24 @ 07:45)  T(F): 98.2 (12-26-24 @ 07:45), Max: 98.2 (12-26-24 @ 07:45)  HR: --  BP: --  BP(mean): --  RR: --  SpO2: --    Orthostatic VS  12-26-24 @ 07:45  Lying BP: --/-- HR: --  Sitting BP: 100/75 HR: 83  Standing BP: 105/74 HR: 92  Site: --  Mode: --    Lipid Panel: 
BMI: BMI (kg/m2): 20.5 (12-03-24 @ 00:54)  HbA1c:   Glucose:   BP: 112/71 (12-02-24 @ 20:52) (112/71 - 125/81)Vital Signs Last 24 Hrs  T(C): 36.9 (12-05-24 @ 08:30), Max: 36.9 (12-05-24 @ 08:30)  T(F): 98.4 (12-05-24 @ 08:30), Max: 98.4 (12-05-24 @ 08:30)  HR: --  BP: --  BP(mean): --  RR: --  SpO2: --    Orthostatic VS  12-05-24 @ 08:30  Lying BP: --/-- HR: --  Sitting BP: 94/64 HR: 80  Standing BP: 96/63 HR: 97  Site: --  Mode: --  Orthostatic VS  12-04-24 @ 08:45  Lying BP: --/-- HR: --  Sitting BP: 117/65 HR: 90  Standing BP: 120/69 HR: 93  Site: --  Mode: --    Lipid Panel: 
BMI: BMI (kg/m2): 49.9 (12-07-24 @ 10:02)  HbA1c:   Glucose:   BP: --Vital Signs Last 24 Hrs  T(C): 36.4 (12-18-24 @ 07:44), Max: 36.4 (12-18-24 @ 07:44)  T(F): 97.6 (12-18-24 @ 07:44), Max: 97.6 (12-18-24 @ 07:44)  HR: --  BP: --  BP(mean): --  RR: --  SpO2: --    Orthostatic VS  12-18-24 @ 07:44  Lying BP: --/-- HR: --  Sitting BP: 109/73 HR: 81  Standing BP: 107/72 HR: 96  Site: --  Mode: --  Orthostatic VS  12-17-24 @ 08:38  Lying BP: --/-- HR: --  Sitting BP: 103/68 HR: 78  Standing BP: 100/73 HR: 86  Site: --  Mode: --    Lipid Panel: 
BMI: BMI (kg/m2): 49.9 (12-07-24 @ 10:02)  HbA1c:   Glucose:   BP: --Vital Signs Last 24 Hrs  T(C): 36.6 (12-27-24 @ 08:13), Max: 36.6 (12-27-24 @ 08:13)  T(F): 97.9 (12-27-24 @ 08:13), Max: 97.9 (12-27-24 @ 08:13)  HR: --  BP: --  BP(mean): --  RR: --  SpO2: --    Orthostatic VS  12-27-24 @ 08:13  Lying BP: --/-- HR: --  Sitting BP: 105/72 HR: 73  Standing BP: 107/74 HR: 73  Site: --  Mode: --  Orthostatic VS  12-26-24 @ 07:45  Lying BP: --/-- HR: --  Sitting BP: 100/75 HR: 83  Standing BP: 105/74 HR: 92  Site: --  Mode: --    Lipid Panel: 
BMI: BMI (kg/m2): 49.9 (12-07-24 @ 10:02)  HbA1c:   Glucose:   BP: --Vital Signs Last 24 Hrs  T(C): 36.4 (12-10-24 @ 07:55), Max: 36.4 (12-10-24 @ 07:55)  T(F): 97.5 (12-10-24 @ 07:55), Max: 97.5 (12-10-24 @ 07:55)  HR: --  BP: --  BP(mean): --  RR: --  SpO2: --    Orthostatic VS  12-10-24 @ 07:55  Lying BP: --/-- HR: --  Sitting BP: 110/66 HR: 78  Standing BP: 118/73 HR: 90  Site: --  Mode: --  Orthostatic VS  12-09-24 @ 08:23  Lying BP: --/-- HR: --  Sitting BP: 114/61 HR: 74  Standing BP: 120/64 HR: 77  Site: --  Mode: --    Lipid Panel: 
BMI: BMI (kg/m2): 49.9 (12-07-24 @ 10:02)  HbA1c:   Glucose:   BP: --Vital Signs Last 24 Hrs  T(C): 36.4 (12-20-24 @ 08:37), Max: 36.4 (12-20-24 @ 08:37)  T(F): 97.6 (12-20-24 @ 08:37), Max: 97.6 (12-20-24 @ 08:37)  HR: --  BP: --  BP(mean): --  RR: --  SpO2: --    Orthostatic VS  12-20-24 @ 08:37  Lying BP: --/-- HR: --  Sitting BP: 118/77 HR: 87  Standing BP: 106/72 HR: 103  Site: --  Mode: --  Orthostatic VS  12-19-24 @ 07:56  Lying BP: --/-- HR: --  Sitting BP: 109/72 HR: 86  Standing BP: 108/77 HR: 97  Site: --  Mode: --    Lipid Panel: 
BMI: BMI (kg/m2): 20.5 (12-03-24 @ 00:54)  HbA1c:   Glucose:   BP: --Vital Signs Last 24 Hrs  T(C): 35.7 (12-06-24 @ 08:18), Max: 35.7 (12-06-24 @ 08:18)  T(F): 96.3 (12-06-24 @ 08:18), Max: 96.3 (12-06-24 @ 08:18)  HR: --  BP: --  BP(mean): --  RR: --  SpO2: --    Orthostatic VS  12-06-24 @ 08:18  Lying BP: --/-- HR: --  Sitting BP: 103/68 HR: 82  Standing BP: 112/83 HR: 97  Site: --  Mode: --  Orthostatic VS  12-05-24 @ 08:30  Lying BP: --/-- HR: --  Sitting BP: 94/64 HR: 80  Standing BP: 96/63 HR: 97  Site: --  Mode: --    Lipid Panel: 
BMI: BMI (kg/m2): 49.9 (12-07-24 @ 10:02)  HbA1c:   Glucose:   BP: --Vital Signs Last 24 Hrs  T(C): 36.4 (01-03-25 @ 08:09), Max: 36.4 (01-03-25 @ 08:09)  T(F): 97.6 (01-03-25 @ 08:09), Max: 97.6 (01-03-25 @ 08:09)  HR: --  BP: --  BP(mean): --  RR: --  SpO2: --    Orthostatic VS  01-03-25 @ 08:09  Lying BP: --/-- HR: --  Sitting BP: 104/76 HR: 77  Standing BP: 107/81 HR: 96  Site: --  Mode: --  Orthostatic VS  01-02-25 @ 08:36  Lying BP: --/-- HR: --  Sitting BP: 94/84 HR: 93  Standing BP: 97/71 HR: 114  Site: --  Mode: --    Lipid Panel: 
BMI: BMI (kg/m2): 49.9 (12-07-24 @ 10:02)  HbA1c:   Glucose:   BP: --Vital Signs Last 24 Hrs  T(C): 36.4 (12-19-24 @ 07:56), Max: 36.4 (12-19-24 @ 07:56)  T(F): 97.6 (12-19-24 @ 07:56), Max: 97.6 (12-19-24 @ 07:56)  HR: --  BP: --  BP(mean): --  RR: --  SpO2: --    Orthostatic VS  12-19-24 @ 07:56  Lying BP: --/-- HR: --  Sitting BP: 109/72 HR: 86  Standing BP: 108/77 HR: 97  Site: --  Mode: --  Orthostatic VS  12-18-24 @ 07:44  Lying BP: --/-- HR: --  Sitting BP: 109/73 HR: 81  Standing BP: 107/72 HR: 96  Site: --  Mode: --    Lipid Panel: 
BMI: BMI (kg/m2): 49.9 (12-07-24 @ 10:02)  HbA1c:   Glucose:   BP: --Vital Signs Last 24 Hrs  T(C): 36.2 (12-30-24 @ 08:19), Max: 36.2 (12-30-24 @ 08:19)  T(F): 97.1 (12-30-24 @ 08:19), Max: 97.1 (12-30-24 @ 08:19)  HR: --  BP: --  BP(mean): --  RR: --  SpO2: --    Orthostatic VS  12-30-24 @ 08:19  Lying BP: --/-- HR: --  Sitting BP: 106/73 HR: 66  Standing BP: 109/67 HR: 69  Site: --  Mode: --  Orthostatic VS  12-29-24 @ 08:46  Lying BP: --/-- HR: --  Sitting BP: 106/73 HR: 77  Standing BP: 97/71 HR: 95  Site: --  Mode: --    Lipid Panel: 
BMI: BMI (kg/m2): 49.9 (12-07-24 @ 10:02)  HbA1c:   Glucose:   BP: --Vital Signs Last 24 Hrs  T(C): 36.6 (12-17-24 @ 08:38), Max: 36.6 (12-17-24 @ 08:38)  T(F): 97.8 (12-17-24 @ 08:38), Max: 97.8 (12-17-24 @ 08:38)  HR: --  BP: --  BP(mean): --  RR: --  SpO2: --    Orthostatic VS  12-17-24 @ 08:38  Lying BP: --/-- HR: --  Sitting BP: 103/68 HR: 78  Standing BP: 100/73 HR: 86  Site: --  Mode: --    Lipid Panel: 
BMI: BMI (kg/m2): 49.9 (12-07-24 @ 10:02)  HbA1c:   Glucose:   BP: --Vital Signs Last 24 Hrs  T(C): --  T(F): --  HR: --  BP: --  BP(mean): --  RR: --  SpO2: --    Orthostatic VS  12-30-24 @ 08:19  Lying BP: --/-- HR: --  Sitting BP: 106/73 HR: 66  Standing BP: 109/67 HR: 69  Site: --  Mode: --    Lipid Panel: 
BMI: BMI (kg/m2): 49.9 (12-07-24 @ 10:02)  HbA1c:   Glucose:   BP: --Vital Signs Last 24 Hrs  T(C): 36.3 (12-13-24 @ 07:51), Max: 36.3 (12-13-24 @ 07:51)  T(F): 97.4 (12-13-24 @ 07:51), Max: 97.4 (12-13-24 @ 07:51)  HR: --  BP: --  BP(mean): --  RR: --  SpO2: --    Orthostatic VS  12-13-24 @ 07:51  Lying BP: --/-- HR: --  Sitting BP: 120/79 HR: 74  Standing BP: 127/82 HR: 85  Site: --  Mode: --  Orthostatic VS  12-12-24 @ 07:57  Lying BP: --/-- HR: --  Sitting BP: 110/87 HR: 95  Standing BP: 114/80 HR: 105  Site: --  Mode: --    Lipid Panel: 
BMI: BMI (kg/m2): 49.9 (12-07-24 @ 10:02)  HbA1c:   Glucose:   BP: --Vital Signs Last 24 Hrs  T(C): --  T(F): --  HR: --  BP: --  BP(mean): --  RR: --  SpO2: --    Orthostatic VS  12-15-24 @ 06:40  Lying BP: --/-- HR: --  Sitting BP: 113/78 HR: 78  Standing BP: 117/85 HR: 90  Site: --  Mode: --    Lipid Panel: 
BMI: BMI (kg/m2): 49.9 (12-07-24 @ 10:02)  HbA1c:   Glucose:   BP: --Vital Signs Last 24 Hrs  T(C): 36.2 (12-09-24 @ 08:23), Max: 36.2 (12-09-24 @ 08:23)  T(F): 97.2 (12-09-24 @ 08:23), Max: 97.2 (12-09-24 @ 08:23)  HR: --  BP: --  BP(mean): --  RR: --  SpO2: --    Orthostatic VS  12-09-24 @ 08:23  Lying BP: --/-- HR: --  Sitting BP: 114/61 HR: 74  Standing BP: 120/64 HR: 77  Site: --  Mode: --  Orthostatic VS  12-08-24 @ 08:41  Lying BP: --/-- HR: --  Sitting BP: 99/82 HR: 92  Standing BP: 108/70 HR: 104  Site: --  Mode: --    Lipid Panel: 
BMI: BMI (kg/m2): 22.3 (01-05-25 @ 09:07)  HbA1c:   Glucose:   BP: --Vital Signs Last 24 Hrs  T(C): --  T(F): --  HR: --  BP: --  BP(mean): --  RR: --  SpO2: --    Orthostatic VS  01-06-25 @ 07:29  Lying BP: --/-- HR: --  Sitting BP: 106/60 HR: 87  Standing BP: --/60 HR: 87  Site: --  Mode: --    Lipid Panel: 
BMI: BMI (kg/m2): 49.9 (12-07-24 @ 10:02)  HbA1c:   Glucose:   BP: --Vital Signs Last 24 Hrs  T(C): 36.6 (12-23-24 @ 07:54), Max: 36.6 (12-23-24 @ 07:54)  T(F): 97.9 (12-23-24 @ 07:54), Max: 97.9 (12-23-24 @ 07:54)  HR: --  BP: --  BP(mean): --  RR: --  SpO2: --    Orthostatic VS  12-23-24 @ 07:54  Lying BP: --/-- HR: --  Sitting BP: 109/77 HR: 87  Standing BP: 102/68 HR: 99  Site: --  Mode: --  Orthostatic VS  12-22-24 @ 07:56  Lying BP: --/-- HR: --  Sitting BP: 119/80 HR: 88  Standing BP: 121/58 HR: 99  Site: --  Mode: --    Lipid Panel:

## 2025-01-08 NOTE — BH INPATIENT PSYCHIATRY PROGRESS NOTE - NSTXDEPRESINTERMD_PSY_ALL_CORE
meds, therapy

## 2025-01-08 NOTE — BH INPATIENT PSYCHIATRY PROGRESS NOTE - CURRENT MEDICATION
MEDICATIONS  (STANDING):  sertraline 250 milliGRAM(s) Oral daily    MEDICATIONS  (PRN):  acetaminophen     Tablet .. 650 milliGRAM(s) Oral every 6 hours PRN Mild Pain (1 - 3), Moderate Pain (4 - 6)  LORazepam     Tablet 1 milliGRAM(s) Oral every 6 hours PRN Agitation  LORazepam   Injectable 2 milliGRAM(s) IntraMuscular Once PRN severe agitation  melatonin. 3 milliGRAM(s) Oral at bedtime PRN Insomnia  traZODone 50 milliGRAM(s) Oral at bedtime PRN insomnia  
MEDICATIONS  (STANDING):  sertraline 100 milliGRAM(s) Oral at bedtime    MEDICATIONS  (PRN):  acetaminophen     Tablet .. 650 milliGRAM(s) Oral every 6 hours PRN Mild Pain (1 - 3), Moderate Pain (4 - 6)  LORazepam     Tablet 1 milliGRAM(s) Oral every 6 hours PRN Agitation  LORazepam   Injectable 2 milliGRAM(s) IntraMuscular Once PRN severe agitation  melatonin. 3 milliGRAM(s) Oral at bedtime PRN Insomnia  traZODone 50 milliGRAM(s) Oral at bedtime PRN insomnia  
MEDICATIONS  (STANDING):  sertraline 200 milliGRAM(s) Oral at bedtime    MEDICATIONS  (PRN):  acetaminophen     Tablet .. 650 milliGRAM(s) Oral every 6 hours PRN Mild Pain (1 - 3), Moderate Pain (4 - 6)  LORazepam     Tablet 1 milliGRAM(s) Oral every 6 hours PRN Agitation  LORazepam   Injectable 2 milliGRAM(s) IntraMuscular Once PRN severe agitation  melatonin. 3 milliGRAM(s) Oral at bedtime PRN Insomnia  traZODone 50 milliGRAM(s) Oral at bedtime PRN insomnia  
MEDICATIONS  (STANDING):  sertraline 200 milliGRAM(s) Oral daily    MEDICATIONS  (PRN):  acetaminophen     Tablet .. 650 milliGRAM(s) Oral every 6 hours PRN Mild Pain (1 - 3), Moderate Pain (4 - 6)  melatonin. 3 milliGRAM(s) Oral at bedtime PRN Insomnia  traZODone 50 milliGRAM(s) Oral at bedtime PRN insomnia  
MEDICATIONS  (STANDING):  sertraline 200 milliGRAM(s) Oral daily    MEDICATIONS  (PRN):  acetaminophen     Tablet .. 650 milliGRAM(s) Oral every 6 hours PRN Mild Pain (1 - 3), Moderate Pain (4 - 6)  LORazepam     Tablet 1 milliGRAM(s) Oral every 6 hours PRN Agitation  LORazepam   Injectable 2 milliGRAM(s) IntraMuscular Once PRN severe agitation  melatonin. 3 milliGRAM(s) Oral at bedtime PRN Insomnia  traZODone 50 milliGRAM(s) Oral at bedtime PRN insomnia  
MEDICATIONS  (STANDING):  sertraline 200 milliGRAM(s) Oral at bedtime    MEDICATIONS  (PRN):  acetaminophen     Tablet .. 650 milliGRAM(s) Oral every 6 hours PRN Mild Pain (1 - 3), Moderate Pain (4 - 6)  LORazepam     Tablet 1 milliGRAM(s) Oral every 6 hours PRN Agitation  LORazepam   Injectable 2 milliGRAM(s) IntraMuscular Once PRN severe agitation  melatonin. 3 milliGRAM(s) Oral at bedtime PRN Insomnia  traZODone 50 milliGRAM(s) Oral at bedtime PRN insomnia  
MEDICATIONS  (STANDING):  sertraline 50 milliGRAM(s) Oral at bedtime    MEDICATIONS  (PRN):  acetaminophen     Tablet .. 650 milliGRAM(s) Oral every 6 hours PRN Mild Pain (1 - 3), Moderate Pain (4 - 6)  LORazepam     Tablet 1 milliGRAM(s) Oral every 6 hours PRN Agitation  LORazepam   Injectable 2 milliGRAM(s) IntraMuscular Once PRN severe agitation  melatonin. 3 milliGRAM(s) Oral at bedtime PRN Insomnia  traZODone 50 milliGRAM(s) Oral at bedtime PRN insomnia  
MEDICATIONS  (STANDING):    MEDICATIONS  (PRN):  acetaminophen     Tablet .. 650 milliGRAM(s) Oral every 6 hours PRN Mild Pain (1 - 3), Moderate Pain (4 - 6)  LORazepam     Tablet 1 milliGRAM(s) Oral every 6 hours PRN Agitation  LORazepam   Injectable 2 milliGRAM(s) IntraMuscular Once PRN severe agitation  melatonin. 3 milliGRAM(s) Oral at bedtime PRN Insomnia  traZODone 50 milliGRAM(s) Oral at bedtime PRN insomnia  
MEDICATIONS  (STANDING):  sertraline 250 milliGRAM(s) Oral daily    MEDICATIONS  (PRN):  acetaminophen     Tablet .. 650 milliGRAM(s) Oral every 6 hours PRN Mild Pain (1 - 3), Moderate Pain (4 - 6)  melatonin. 3 milliGRAM(s) Oral at bedtime PRN Insomnia  traZODone 50 milliGRAM(s) Oral at bedtime PRN insomnia  
MEDICATIONS  (STANDING):  sertraline 300 milliGRAM(s) Oral daily    MEDICATIONS  (PRN):  acetaminophen     Tablet .. 650 milliGRAM(s) Oral every 6 hours PRN Mild Pain (1 - 3), Moderate Pain (4 - 6)  LORazepam     Tablet 1 milliGRAM(s) Oral every 6 hours PRN Agitation  LORazepam   Injectable 2 milliGRAM(s) IntraMuscular Once PRN severe agitation  melatonin. 3 milliGRAM(s) Oral at bedtime PRN Insomnia  traZODone 50 milliGRAM(s) Oral at bedtime PRN insomnia  
MEDICATIONS  (STANDING):  sertraline 50 milliGRAM(s) Oral at bedtime    MEDICATIONS  (PRN):  acetaminophen     Tablet .. 650 milliGRAM(s) Oral every 6 hours PRN Mild Pain (1 - 3), Moderate Pain (4 - 6)  LORazepam     Tablet 1 milliGRAM(s) Oral every 6 hours PRN Agitation  LORazepam   Injectable 2 milliGRAM(s) IntraMuscular Once PRN severe agitation  melatonin. 3 milliGRAM(s) Oral at bedtime PRN Insomnia  traZODone 50 milliGRAM(s) Oral at bedtime PRN insomnia  
MEDICATIONS  (STANDING):  sertraline 200 milliGRAM(s) Oral daily    MEDICATIONS  (PRN):  acetaminophen     Tablet .. 650 milliGRAM(s) Oral every 6 hours PRN Mild Pain (1 - 3), Moderate Pain (4 - 6)  melatonin. 3 milliGRAM(s) Oral at bedtime PRN Insomnia  traZODone 50 milliGRAM(s) Oral at bedtime PRN insomnia  
MEDICATIONS  (STANDING):  sertraline 100 milliGRAM(s) Oral at bedtime    MEDICATIONS  (PRN):  acetaminophen     Tablet .. 650 milliGRAM(s) Oral every 6 hours PRN Mild Pain (1 - 3), Moderate Pain (4 - 6)  LORazepam     Tablet 1 milliGRAM(s) Oral every 6 hours PRN Agitation  LORazepam   Injectable 2 milliGRAM(s) IntraMuscular Once PRN severe agitation  melatonin. 3 milliGRAM(s) Oral at bedtime PRN Insomnia  traZODone 50 milliGRAM(s) Oral at bedtime PRN insomnia  
MEDICATIONS  (STANDING):  sertraline 350 milliGRAM(s) Oral daily    MEDICATIONS  (PRN):  acetaminophen     Tablet .. 650 milliGRAM(s) Oral every 6 hours PRN Mild Pain (1 - 3), Moderate Pain (4 - 6)  LORazepam     Tablet 1 milliGRAM(s) Oral every 6 hours PRN Agitation  LORazepam   Injectable 2 milliGRAM(s) IntraMuscular Once PRN severe agitation  melatonin. 3 milliGRAM(s) Oral at bedtime PRN Insomnia  traZODone 50 milliGRAM(s) Oral at bedtime PRN insomnia  
MEDICATIONS  (STANDING):  sertraline 50 milliGRAM(s) Oral at bedtime    MEDICATIONS  (PRN):  acetaminophen     Tablet .. 650 milliGRAM(s) Oral every 6 hours PRN Mild Pain (1 - 3), Moderate Pain (4 - 6)  LORazepam     Tablet 1 milliGRAM(s) Oral every 6 hours PRN Agitation  LORazepam   Injectable 2 milliGRAM(s) IntraMuscular Once PRN severe agitation  melatonin. 3 milliGRAM(s) Oral at bedtime PRN Insomnia  traZODone 50 milliGRAM(s) Oral at bedtime PRN insomnia  
MEDICATIONS  (STANDING):  sertraline 250 milliGRAM(s) Oral daily    MEDICATIONS  (PRN):  acetaminophen     Tablet .. 650 milliGRAM(s) Oral every 6 hours PRN Mild Pain (1 - 3), Moderate Pain (4 - 6)  LORazepam     Tablet 1 milliGRAM(s) Oral every 6 hours PRN Agitation  LORazepam   Injectable 2 milliGRAM(s) IntraMuscular Once PRN severe agitation  melatonin. 3 milliGRAM(s) Oral at bedtime PRN Insomnia  traZODone 50 milliGRAM(s) Oral at bedtime PRN insomnia  
MEDICATIONS  (STANDING):  sertraline 150 milliGRAM(s) Oral at bedtime    MEDICATIONS  (PRN):  acetaminophen     Tablet .. 650 milliGRAM(s) Oral every 6 hours PRN Mild Pain (1 - 3), Moderate Pain (4 - 6)  LORazepam     Tablet 1 milliGRAM(s) Oral every 6 hours PRN Agitation  LORazepam   Injectable 2 milliGRAM(s) IntraMuscular Once PRN severe agitation  melatonin. 3 milliGRAM(s) Oral at bedtime PRN Insomnia  traZODone 50 milliGRAM(s) Oral at bedtime PRN insomnia  
MEDICATIONS  (STANDING):  sertraline 200 milliGRAM(s) Oral daily    MEDICATIONS  (PRN):  acetaminophen     Tablet .. 650 milliGRAM(s) Oral every 6 hours PRN Mild Pain (1 - 3), Moderate Pain (4 - 6)  LORazepam     Tablet 1 milliGRAM(s) Oral every 6 hours PRN Agitation  LORazepam   Injectable 2 milliGRAM(s) IntraMuscular Once PRN severe agitation  melatonin. 3 milliGRAM(s) Oral at bedtime PRN Insomnia  traZODone 50 milliGRAM(s) Oral at bedtime PRN insomnia  
MEDICATIONS  (STANDING):  sertraline 300 milliGRAM(s) Oral daily    MEDICATIONS  (PRN):  acetaminophen     Tablet .. 650 milliGRAM(s) Oral every 6 hours PRN Mild Pain (1 - 3), Moderate Pain (4 - 6)  LORazepam     Tablet 1 milliGRAM(s) Oral every 6 hours PRN Agitation  LORazepam   Injectable 2 milliGRAM(s) IntraMuscular Once PRN severe agitation  melatonin. 3 milliGRAM(s) Oral at bedtime PRN Insomnia  traZODone 50 milliGRAM(s) Oral at bedtime PRN insomnia  
MEDICATIONS  (STANDING):  sertraline 350 milliGRAM(s) Oral daily    MEDICATIONS  (PRN):  acetaminophen     Tablet .. 650 milliGRAM(s) Oral every 6 hours PRN Mild Pain (1 - 3), Moderate Pain (4 - 6)  LORazepam     Tablet 1 milliGRAM(s) Oral every 6 hours PRN Agitation  LORazepam   Injectable 2 milliGRAM(s) IntraMuscular Once PRN severe agitation  melatonin. 3 milliGRAM(s) Oral at bedtime PRN Insomnia  traZODone 50 milliGRAM(s) Oral at bedtime PRN insomnia  
MEDICATIONS  (STANDING):    MEDICATIONS  (PRN):  acetaminophen     Tablet .. 650 milliGRAM(s) Oral every 6 hours PRN Mild Pain (1 - 3), Moderate Pain (4 - 6)  LORazepam     Tablet 1 milliGRAM(s) Oral every 6 hours PRN Agitation  LORazepam   Injectable 2 milliGRAM(s) IntraMuscular Once PRN severe agitation  melatonin. 3 milliGRAM(s) Oral at bedtime PRN Insomnia  traZODone 50 milliGRAM(s) Oral at bedtime PRN insomnia  
MEDICATIONS  (STANDING):    MEDICATIONS  (PRN):  acetaminophen     Tablet .. 650 milliGRAM(s) Oral every 6 hours PRN Mild Pain (1 - 3), Moderate Pain (4 - 6)  LORazepam     Tablet 1 milliGRAM(s) Oral every 6 hours PRN Agitation  LORazepam   Injectable 2 milliGRAM(s) IntraMuscular Once PRN severe agitation  melatonin. 3 milliGRAM(s) Oral at bedtime PRN Insomnia  traZODone 50 milliGRAM(s) Oral at bedtime PRN insomnia  
MEDICATIONS  (STANDING):  sertraline 25 milliGRAM(s) Oral at bedtime    MEDICATIONS  (PRN):  acetaminophen     Tablet .. 650 milliGRAM(s) Oral every 6 hours PRN Mild Pain (1 - 3), Moderate Pain (4 - 6)  LORazepam     Tablet 1 milliGRAM(s) Oral every 6 hours PRN Agitation  LORazepam   Injectable 2 milliGRAM(s) IntraMuscular Once PRN severe agitation  melatonin. 3 milliGRAM(s) Oral at bedtime PRN Insomnia  traZODone 50 milliGRAM(s) Oral at bedtime PRN insomnia  
MEDICATIONS  (STANDING):  sertraline 200 milliGRAM(s) Oral daily    MEDICATIONS  (PRN):  acetaminophen     Tablet .. 650 milliGRAM(s) Oral every 6 hours PRN Mild Pain (1 - 3), Moderate Pain (4 - 6)  LORazepam     Tablet 1 milliGRAM(s) Oral every 6 hours PRN Agitation  LORazepam   Injectable 2 milliGRAM(s) IntraMuscular Once PRN severe agitation  melatonin. 3 milliGRAM(s) Oral at bedtime PRN Insomnia  traZODone 50 milliGRAM(s) Oral at bedtime PRN insomnia  
MEDICATIONS  (STANDING):  sertraline 200 milliGRAM(s) Oral at bedtime    MEDICATIONS  (PRN):  acetaminophen     Tablet .. 650 milliGRAM(s) Oral every 6 hours PRN Mild Pain (1 - 3), Moderate Pain (4 - 6)  LORazepam     Tablet 1 milliGRAM(s) Oral every 6 hours PRN Agitation  LORazepam   Injectable 2 milliGRAM(s) IntraMuscular Once PRN severe agitation  melatonin. 3 milliGRAM(s) Oral at bedtime PRN Insomnia  traZODone 50 milliGRAM(s) Oral at bedtime PRN insomnia

## 2025-01-08 NOTE — BH INPATIENT PSYCHIATRY PROGRESS NOTE - NSTXDCOPLKGOAL_PSY_ALL_CORE
[address] [facility] ED  [address] [place] [id number]  Phone: [phone]  Fax: [phone]             February 2, 2020    Patient: Arielle Bustamante   YOB: 1992   Date of Visit: 2/2/2020       To Whom It May Concern:    Parish Houston was seen and treated in our emergency department on 2/2/2020. Please excuse her from work through 02/03/2020. Thank you.          Sincerely,             Signature:__________________________________
Will agree to consider an appropriate level of outpatient care

## 2025-01-08 NOTE — BH INPATIENT PSYCHIATRY PROGRESS NOTE - NSTXPROBMEDIC_PSY_ALL_CORE
MEDICATION/TREATMENT NON-COMPLIANCE

## 2025-01-08 NOTE — BH INPATIENT PSYCHIATRY PROGRESS NOTE - PRN MEDS
MEDICATIONS  (PRN):  acetaminophen     Tablet .. 650 milliGRAM(s) Oral every 6 hours PRN Mild Pain (1 - 3), Moderate Pain (4 - 6)  LORazepam     Tablet 1 milliGRAM(s) Oral every 6 hours PRN Agitation  LORazepam   Injectable 2 milliGRAM(s) IntraMuscular Once PRN severe agitation  melatonin. 3 milliGRAM(s) Oral at bedtime PRN Insomnia  traZODone 50 milliGRAM(s) Oral at bedtime PRN insomnia  
MEDICATIONS  (PRN):  acetaminophen     Tablet .. 650 milliGRAM(s) Oral every 6 hours PRN Mild Pain (1 - 3), Moderate Pain (4 - 6)  melatonin. 3 milliGRAM(s) Oral at bedtime PRN Insomnia  traZODone 50 milliGRAM(s) Oral at bedtime PRN insomnia  
MEDICATIONS  (PRN):  acetaminophen     Tablet .. 650 milliGRAM(s) Oral every 6 hours PRN Mild Pain (1 - 3), Moderate Pain (4 - 6)  LORazepam     Tablet 1 milliGRAM(s) Oral every 6 hours PRN Agitation  LORazepam   Injectable 2 milliGRAM(s) IntraMuscular Once PRN severe agitation  melatonin. 3 milliGRAM(s) Oral at bedtime PRN Insomnia  traZODone 50 milliGRAM(s) Oral at bedtime PRN insomnia  
MEDICATIONS  (PRN):  acetaminophen     Tablet .. 650 milliGRAM(s) Oral every 6 hours PRN Mild Pain (1 - 3), Moderate Pain (4 - 6)  melatonin. 3 milliGRAM(s) Oral at bedtime PRN Insomnia  traZODone 50 milliGRAM(s) Oral at bedtime PRN insomnia  
MEDICATIONS  (PRN):  acetaminophen     Tablet .. 650 milliGRAM(s) Oral every 6 hours PRN Mild Pain (1 - 3), Moderate Pain (4 - 6)  LORazepam     Tablet 1 milliGRAM(s) Oral every 6 hours PRN Agitation  LORazepam   Injectable 2 milliGRAM(s) IntraMuscular Once PRN severe agitation  melatonin. 3 milliGRAM(s) Oral at bedtime PRN Insomnia  traZODone 50 milliGRAM(s) Oral at bedtime PRN insomnia  
MEDICATIONS  (PRN):  acetaminophen     Tablet .. 650 milliGRAM(s) Oral every 6 hours PRN Mild Pain (1 - 3), Moderate Pain (4 - 6)  melatonin. 3 milliGRAM(s) Oral at bedtime PRN Insomnia  traZODone 50 milliGRAM(s) Oral at bedtime PRN insomnia

## 2025-01-08 NOTE — BH INPATIENT PSYCHIATRY PROGRESS NOTE - NSTXOBSCOMDATEEST_PSY_ALL_CORE
03-Dec-2024
31-Dec-2024
23-Dec-2024
03-Dec-2024
31-Dec-2024
03-Dec-2024
04-Dec-2024
03-Dec-2024
18-Dec-2024
03-Dec-2024
04-Dec-2024
02-Dec-2024
26-Dec-2024
03-Dec-2024
18-Dec-2024
04-Dec-2024
31-Dec-2024
03-Dec-2024
26-Dec-2024
31-Dec-2024
04-Dec-2024
03-Dec-2024
18-Dec-2024
04-Dec-2024
23-Dec-2024

## 2025-01-08 NOTE — BH INPATIENT PSYCHIATRY PROGRESS NOTE - NSTXMEDICDATETRGT_PSY_ALL_CORE
07-Jan-2025
11-Dec-2024
25-Dec-2024
07-Jan-2025
07-Jan-2025
18-Dec-2024
02-Jan-2025
11-Dec-2024
11-Dec-2024
25-Dec-2024
07-Jan-2025
11-Dec-2024
11-Dec-2024
18-Dec-2024
02-Jan-2025
11-Dec-2024
18-Dec-2024
18-Dec-2024
25-Dec-2024
18-Dec-2024
14-Jan-2025
25-Dec-2024
02-Jan-2025
25-Dec-2024
02-Jan-2025

## 2025-01-09 VITALS — TEMPERATURE: 96 F

## 2025-01-09 PROCEDURE — 99239 HOSP IP/OBS DSCHRG MGMT >30: CPT

## 2025-01-09 RX ADMIN — SERTRALINE HYDROCHLORIDE 350 MILLIGRAM(S): 25 TABLET ORAL at 09:12

## 2025-01-09 NOTE — BH INPATIENT PSYCHIATRY DISCHARGE NOTE - NSDCMRMEDTOKEN_GEN_ALL_CORE_FT
melatonin 3 mg oral tablet: 1 tab(s) orally once a day (at bedtime) as needed for Insomnia  sertraline 50 mg oral tablet: 7 tab(s) orally once a day  traZODone 50 mg oral tablet: 1 tab(s) orally once a day (at bedtime) as needed for insomnia

## 2025-01-09 NOTE — BH INPATIENT PSYCHIATRY DISCHARGE NOTE - ATTENDING DISCHARGE PHYSICAL EXAMINATION:
Conscious, cooperative, alert.   Fair hygiene  No psychomotor agitation/retardation.   Fair eye contact.   Speech: regular rate and rhythm.  Mood: "a little nervous"   Affect: neutral, constricted  Thought Process: linear, goal directed   Thought Content: Contamination Obsessions ongoing, improving. No Suicidal ideation/intent/plan, No homicidal ideation/intent/plan. No delusions   Perception: No hallucinations   Insight and Judgement: fair  Impulse Control: fair at this time.

## 2025-01-09 NOTE — BH INPATIENT PSYCHIATRY DISCHARGE NOTE - HOSPITAL COURSE
Patient was admitted after a long periods of time at home not functioning.  He spent a stretch of nearly 2 weeks without eating.  He lied in bed most of the day out of avoidance of the structural problems and squalor of his apartment and notes he was in denial about actually needing to be evicted.  Noted that various contaminations would need specialized cleaning and pleaded in the hospital with landlord to warn any new tenants about what had occurred and the presence of mold of which there was only some evidence.  Patient noted his resistance to treatment and change, that compulsions felt like moral imperatives even if he hated performing them.  Overall, patient continued to have obsessive thinking in the hospital but had minimal avoidance behaviors partly due to the stabilizing environment of the hospital including the cleanliness he noted.  He was titrated on sertraline (had only at a trial up to 50mg in the past and noted some benefit).  He complied with titration even though he expressed skepticism about meds in general and what permanent effects they could have on his brain.  He hinted that in some way he may see meds as a contaminant.  Overall he tolerated sertraline well up to 350mg.  Efforts were made to find treatment in [facility] but his [place] medicaid status made this coordination difficult and he will follow up with [facility] which can see him multiple times in a week.      Suicide and risk assessment performed prior to discharge. The patient has a low acute risk and low chronic risk of self-harm and aggression towards others. Protective factors include denying SI, no SIB, denying HI, good social supports in their family, no substance abuse, no current mood symptoms, no hopelessness, future-oriented in returning to home, no access to firearms.  Risk factors include presenting illness, resistance to change, obsessive compulsive and narcissistic personality features. Immediate risk was minimized by inpatient admission to a safe environment with appropriate supervision and limited access to lethal means. Future risk was minimized before discharge by treatment of acute episode, maximizing outpatient support, providing relevant patient education, discussing emergency procedures, and ensuring close follow-up. The patient remains at a low risk of self-harm, and such risk cannot be further ameliorated by continued inpatient treatment and the patient is therefore appropriate for discharge.       There were no behavioral problems on the unit.  Patient did not become agitated and did not require emergent intramuscular medications or seclusion / restraints.  Patient did not self-harm on the unit.  Patient remained actively engaged in treatment.  Patient participated in individual, group, and milieu therapy.  Patient got along appropriately with staff and peers.   Patient did not have any medical problems during this hospitalization.  There were no medical consultations.    A full discussion of the factors that predict treatment success and relapse was held including safety planning.  A discussion of the risks and benefits of patient’s medication was held.    The patient has improved significantly and no longer requires inpatient treatment and care. Patient denies all suicidal and aggressive ideation, intent and plan. Patient denies anxiety symptoms and panic attacks. Patient is not judged to be an acute danger to self or others at this time. Patient will be discharged today to home and outpatient follow up.

## 2025-01-09 NOTE — BH INPATIENT PSYCHIATRY DISCHARGE NOTE - NSBHFUPINTERVALHXFT_PSY_A_CORE
Discharge Progress Note Date and Time: 01-09-25 @ 08:37    Patient seen individually for discharge day management. Greater than 30 minutes was spent with patient, staff and charting as part of discharge day management. I was physically present during the service to the patient and personally examined the patient and I was directly involved in the management plan and recommendations of the care provided to the patient.      Chart reviewed. Case discussed with treatment team. Patient seen and examined for f/u of OCD. No acute overnight events, no PRNs required/requested. Compliant with standing medications, denied side effects. Patient continues to be social on the unit and to engage in activities. Feels obsessions have lessened. States that being in the hospital has felt like "a monthlong slumber party where I can slide in socks on a floor I know is washed twice a day every day."  States it is hard to gague obsessions here because things are cleaned consistently and are out of his control largely.  He notes that compulsions usually feel like moral imperatives and standards he must uphold even if he hates doing so.  Discusses how he did not eat for 9 days in a row and nearly 15 days with one meal punctuating that stretch due to diet concerns.  Denies issues with diet here.  States he will try to make the most of a bad situation living in [place] but is feels prognosis is guarded about how he will cope.  He denies SI, self harm urges, HI, and AH.  Discussed addressing resistances in future therapy.

## 2025-01-09 NOTE — BH INPATIENT PSYCHIATRY DISCHARGE NOTE - DETAILS
mother - hx of anxiety; past in-Pt psych admissions; father has hx of alcoholism (currently sober).. no reported hx of SA

## 2025-01-09 NOTE — BH INPATIENT PSYCHIATRY DISCHARGE NOTE - HPI (INCLUDE ILLNESS QUALITY, SEVERITY, DURATION, TIMING, CONTEXT, MODIFYING FACTORS, ASSOCIATED SIGNS AND SYMPTOMS)
29 yr old male, single, domiciled alone and self employed. with self reported hx of depression, anxiety, OCD and ADHD; no reported hx of psych admissions; denied any hx of SA but did have past hx of engaging in NSSIB - none recently; he denied any recent illicit substance use.  as per Psyckes, with multiple psych diagnoses of Major Depressive Disorder, Unspecified/Other Anxiety Disorder,  Generalized Anxiety Disorder, Restless Legs Syndrome  and Unspecified/Other Depressive Disorder.  Pt is currently not in psych care.  today, presented to the ED accompanied by sister due to worsening symptoms of anxiety + depression (predominating symptoms more of anxiety rather than depression).    Patient seen in interview on 1N.  Confirms content from ED assessment and collateral.  Patient states he has never functioned.  He worked very hard in high school to achieve A level grades and then attended [employer].  Once in college, he could not keep up with work because if he did not complete an assignment by a deadlines, there was no one holding him accountable to make it up.  He eventually had to leave school, attended [employer] at that time but then also could not keep up with this work.  He attributes this to ADHD and poor executive function.  States stimulants at times help with this but then stop working and only work at higher doses after a break.  Notes that OCD symptoms have waxed and waned since childhood but the past 1.5 years has been the worst.  Notes uncertainty about mold and mold spores have kept him bedridden and from showering for long periods of time.  His mother who also has anxiety brought him to [place] to try to seek treatment near where she lives.  However, while he was gone, his apartment fell to disrepair with a burst pipe and other issues and he was evicted as a result.  He is currently in the eviction process.  His family staged an intervention to lead him to the hospital.  He states he has numerous med trials and has done ERP but has issues with compliance with both these because he often feels he needs to get better on his own terms.  He denies SI.  Denies current depressed mood.     Per ED assessment:   <<seen bedside.  anxious and dysphoric. claimed that as a child, already had experienced symptoms suggestive of OCD. apart from OCD, also endorses life time hx of depression.  described OCD symptoms experienced - especially recently, as worsening contamination symptoms; other symptoms include "supernatural OCD" - which he described as seeing signs/ symbols - pointing to him "going to hell" (not necessarily believing this since he is not currently [employer]). has past hx of engaging in childhood rituals; e.g repeatedly flicking switch for bathroom light - to "scare spiders away"; there was also past hx of compulsive  praying in his younger yrs (was raised a [employer] but currently, he is not [employer]).      for the past yr, his predominating OCD symptom is of contamination. specifically, centered on impact of mold spores causing infection/ impact on his health. e.g. cleaned bathroom using vinegar copiously that it led to the bathroom getting destroyed - leading him not to take showers (for almost half a yr) out of fear that if he gets into the shower, he will be "contaminated with the spores".  then relegated to laying in bed for days - not getting up; urinating/ fecal movement on the bed.      regarding his depressive symptoms, described self as "an extremely unhappy person who was always hard on himself". denied attaining any euthymic episode recently. described symptoms as experiencing sad mood daily accompanied by anhedonia; associated symptoms include: poor concentration; sleep disturbances; low energy level. denied any changes to his sleeping pattern. no SI or HI. denied feeling hopeless/ helpless/ worthless    He denied experiencing any signs/ symptoms suggestive of homer (denied grandiosity/ racing thoughts/ increased goal directed activities or engaged in risk taking behavior/ no pressured speech/ no elevated mood/ denied any increased in energy level causing sleep disruption).  no reported perceptual disturbances experienced. he is not paranoid.. denied any thought insertion/ withdrawal/ broadcasting    ** see separate [facility] notes for collateral information obtained from his sister **   (if needs update on Pt's status, can also contact uncle Ariel Alaniz at [phone])>>

## 2025-01-09 NOTE — BH INPATIENT PSYCHIATRY DISCHARGE NOTE - NSDCCPCAREPLAN_GEN_ALL_CORE_FT
PRINCIPAL DISCHARGE DIAGNOSIS  Diagnosis: OCD (obsessive compulsive disorder)  Assessment and Plan of Treatment:

## 2025-01-09 NOTE — BH INPATIENT PSYCHIATRY DISCHARGE NOTE - NSBHMETABOLIC_PSY_ALL_CORE_FT
BMI: BMI (kg/m2): 22.3 (01-05-25 @ 09:07)  HbA1c:   Glucose:   BP: --Vital Signs Last 24 Hrs  T(C): --  T(F): --  HR: --  BP: --  BP(mean): --  RR: --  SpO2: --    Orthostatic VS  01-08-25 @ 07:46  Lying BP: --/-- HR: --  Sitting BP: 108/86 HR: 81  Standing BP: 113/81 HR: 94  Site: --  Mode: --    Lipid Panel:

## 2025-01-09 NOTE — BH INPATIENT PSYCHIATRY DISCHARGE NOTE - DISCHARGE SERVICE FOR PATIENT
8
on the discharge service for the patient. I have reviewed and made amendments to the documentation where necessary.

## 2025-01-09 NOTE — BH INPATIENT PSYCHIATRY DISCHARGE NOTE - NSBHASSESSSUMMFT_PSY_ALL_CORE
29M with history of OCD with multiple med trials and ERP in the past, not functioning with very poor hygiene and bed ridden due to obsessions and accompanying avoidance behaviors regarding contamination with invisible contaminants like mold spores.  Reports he seeks voluntary admission due to family intervention.  States he has been frequently noncompliant with past treatment because of need to feel he is improving on his own terms.  Patient appears euthymic and is talkative.  Suspect characterologic component to treatment failures thus far.    Tolerating sertraline. Remains with severe obsessions but improving.  Discharge today.    Sertraline to 350mg daily  Ativan PRN for agitation